# Patient Record
Sex: MALE | Race: WHITE | NOT HISPANIC OR LATINO | Employment: OTHER | URBAN - METROPOLITAN AREA
[De-identification: names, ages, dates, MRNs, and addresses within clinical notes are randomized per-mention and may not be internally consistent; named-entity substitution may affect disease eponyms.]

---

## 2023-02-26 ENCOUNTER — HOSPITAL ENCOUNTER (EMERGENCY)
Facility: CLINIC | Age: 73
Discharge: HOME | End: 2023-02-26
Attending: EMERGENCY MEDICINE | Admitting: EMERGENCY MEDICINE
Payer: MEDICARE

## 2023-02-26 ENCOUNTER — APPOINTMENT (OUTPATIENT)
Dept: ULTRASOUND IMAGING | Facility: CLINIC | Age: 73
End: 2023-02-26
Attending: EMERGENCY MEDICINE
Payer: MEDICARE

## 2023-02-26 VITALS
RESPIRATION RATE: 15 BRPM | DIASTOLIC BLOOD PRESSURE: 90 MMHG | TEMPERATURE: 97.6 F | OXYGEN SATURATION: 98 % | SYSTOLIC BLOOD PRESSURE: 160 MMHG | HEART RATE: 80 BPM

## 2023-02-26 DIAGNOSIS — I82.4Y2 ACUTE DEEP VEIN THROMBOSIS (DVT) OF PROXIMAL VEIN OF LEFT LOWER EXTREMITY (H): ICD-10-CM

## 2023-02-26 DIAGNOSIS — N18.9 ACUTE ON CHRONIC RENAL INSUFFICIENCY: ICD-10-CM

## 2023-02-26 DIAGNOSIS — N28.9 ACUTE ON CHRONIC RENAL INSUFFICIENCY: ICD-10-CM

## 2023-02-26 LAB
ANION GAP SERPL CALCULATED.3IONS-SCNC: 14 MMOL/L (ref 7–15)
BUN SERPL-MCNC: 32.3 MG/DL (ref 8–23)
CALCIUM SERPL-MCNC: 9.2 MG/DL (ref 8.8–10.2)
CHLORIDE SERPL-SCNC: 109 MMOL/L (ref 98–107)
CREAT SERPL-MCNC: 2.07 MG/DL (ref 0.67–1.17)
DEPRECATED HCO3 PLAS-SCNC: 20 MMOL/L (ref 22–29)
ERYTHROCYTE [DISTWIDTH] IN BLOOD BY AUTOMATED COUNT: 14.2 % (ref 10–15)
GFR SERPL CREATININE-BSD FRML MDRD: 33 ML/MIN/1.73M2
GLUCOSE SERPL-MCNC: 110 MG/DL (ref 70–99)
HCT VFR BLD AUTO: 46.7 % (ref 40–53)
HGB BLD-MCNC: 14.9 G/DL (ref 13.3–17.7)
INR PPP: 1.18 (ref 0.85–1.15)
MCH RBC QN AUTO: 31.4 PG (ref 26.5–33)
MCHC RBC AUTO-ENTMCNC: 31.9 G/DL (ref 31.5–36.5)
MCV RBC AUTO: 98 FL (ref 78–100)
PLATELET # BLD AUTO: 208 10E3/UL (ref 150–450)
POTASSIUM SERPL-SCNC: 4.8 MMOL/L (ref 3.4–5.3)
RBC # BLD AUTO: 4.75 10E6/UL (ref 4.4–5.9)
SODIUM SERPL-SCNC: 143 MMOL/L (ref 136–145)
WBC # BLD AUTO: 7.8 10E3/UL (ref 4–11)

## 2023-02-26 PROCEDURE — 258N000003 HC RX IP 258 OP 636: Performed by: EMERGENCY MEDICINE

## 2023-02-26 PROCEDURE — 93971 EXTREMITY STUDY: CPT | Mod: LT

## 2023-02-26 PROCEDURE — 85014 HEMATOCRIT: CPT | Performed by: EMERGENCY MEDICINE

## 2023-02-26 PROCEDURE — 250N000013 HC RX MED GY IP 250 OP 250 PS 637: Performed by: EMERGENCY MEDICINE

## 2023-02-26 PROCEDURE — 96360 HYDRATION IV INFUSION INIT: CPT

## 2023-02-26 PROCEDURE — 36415 COLL VENOUS BLD VENIPUNCTURE: CPT | Performed by: EMERGENCY MEDICINE

## 2023-02-26 PROCEDURE — 99284 EMERGENCY DEPT VISIT MOD MDM: CPT | Mod: 25

## 2023-02-26 PROCEDURE — 85610 PROTHROMBIN TIME: CPT | Performed by: EMERGENCY MEDICINE

## 2023-02-26 PROCEDURE — 80048 BASIC METABOLIC PNL TOTAL CA: CPT | Performed by: EMERGENCY MEDICINE

## 2023-02-26 RX ORDER — ATORVASTATIN CALCIUM 20 MG/1
1 TABLET, FILM COATED ORAL DAILY
COMMUNITY
Start: 2023-01-02

## 2023-02-26 RX ORDER — VITAMIN E 268 MG
400 CAPSULE ORAL
COMMUNITY

## 2023-02-26 RX ORDER — APIXABAN 5 MG (74)
KIT ORAL
Qty: 1 EACH | Refills: 0 | Status: SHIPPED | OUTPATIENT
Start: 2023-02-26 | End: 2023-03-28

## 2023-02-26 RX ORDER — LISINOPRIL 40 MG/1
20 TABLET ORAL DAILY
Status: ON HOLD | COMMUNITY
Start: 2023-01-14 | End: 2024-05-20

## 2023-02-26 RX ORDER — CARVEDILOL 12.5 MG/1
12.5 TABLET ORAL 2 TIMES DAILY
Status: ON HOLD | COMMUNITY
Start: 2023-01-14 | End: 2024-05-20

## 2023-02-26 RX ORDER — LIDOCAINE 40 MG/G
CREAM TOPICAL
Status: DISCONTINUED | OUTPATIENT
Start: 2023-02-26 | End: 2023-02-26 | Stop reason: HOSPADM

## 2023-02-26 RX ADMIN — SODIUM CHLORIDE 1000 ML: 9 INJECTION, SOLUTION INTRAVENOUS at 14:26

## 2023-02-26 RX ADMIN — APIXABAN 10 MG: 5 TABLET, FILM COATED ORAL at 14:49

## 2023-02-26 ASSESSMENT — ACTIVITIES OF DAILY LIVING (ADL)
ADLS_ACUITY_SCORE: 35
ADLS_ACUITY_SCORE: 35

## 2023-02-26 NOTE — ED PROVIDER NOTES
History     Chief Complaint:  Leg Pain       HPI   Demetrius Scruggs is a 72 year old male with a history of coronary artery disease with previous MI on aspirin who presents with a sore cramping sensation on his distal medial left calf starting on Wednesday.  The patient reports that he has not traveled has not been immobilized no recent surgery or procedures has been sitting reading more frequently and has no previous history of DVT.  The patient said the pain does not travel it is mainly when he walks.  He was concerned about this and therefore presented to the ER.  The patient has not had a recent hemoglobin and has no previous history of a DVT  His wife was also interviewed    Review of External Notes: I reviewed external notes to obtain history on past medical history as well as meds    ROS:  Review of Systems  8 point review of systems all negative except as in HPI    Allergies:  No Known Allergies     Medications:    Sildenafil  Lisinopril  Atorvastatin  Coreg  Metformin  Aspirin    Past Medical History:    Type II diabetes mellitus  Stage III CKD  Coronary disease  Ischemic heart disease  MI  Vitamin D deficiency  Hypertension     Past Surgical History:    Coronary artery bypass    Family History:  Heart disease - father  Alzheimer's disease - mother  Breast cancer - mother    Social History:  Patient is accompanied in the ED by his wife.  PCP: Guzman Schmidt     Physical Exam     Patient Vitals for the past 24 hrs:   BP Temp Temp src Pulse Resp SpO2   02/26/23 1528 (!) 160/90 -- -- 80 15 98 %   02/26/23 0953 (!) 147/96 -- -- 74 18 95 %   02/26/23 0951 -- 97.6  F (36.4  C) Temporal -- -- --        Physical Exam  General: The patient is alert, in no respiratory distress.      Cardiovascular: Regular rate and rhythm. Good pulses      Respiratory: Lungs are clear.     Gastrointestinal: Abdomen soft.     Musculoskeletal: No gross deformity.  No bony tenderness no joint swelling.  He has mild  tenderness along the left medial calf    Skin: No rashes or petechiae.  Normal coloration between both lower extremities    Neurologic: The patient is alert and oriented.  Good distal function.  Pleasant and conversant    Lymphatic: . No significant lower extremity swelling.    Psychiatric: The patient is non-tearful.    Emergency Department Course     Imaging:  US Lower Extremity Venous Duplex Left   Final Result   IMPRESSION:   1.  Positive study for deep vein thrombus.              Report per radiology    Laboratory:  Labs Ordered and Resulted from Time of ED Arrival to Time of ED Departure   BASIC METABOLIC PANEL - Abnormal       Result Value    Sodium 143      Potassium 4.8      Chloride 109 (*)     Carbon Dioxide (CO2) 20 (*)     Anion Gap 14      Urea Nitrogen 32.3 (*)     Creatinine 2.07 (*)     Calcium 9.2      Glucose 110 (*)     GFR Estimate 33 (*)    INR - Abnormal    INR 1.18 (*)    CBC WITH PLATELETS - Normal    WBC Count 7.8      RBC Count 4.75      Hemoglobin 14.9      Hematocrit 46.7      MCV 98      MCH 31.4      MCHC 31.9      RDW 14.2      Platelet Count 208          Procedures   none    Emergency Department Course & Assessments:       Interventions:  Medications   lidocaine 1 % 0.1-1 mL (has no administration in time range)   lidocaine (LMX4) cream (has no administration in time range)   sodium chloride (PF) 0.9% PF flush 3 mL (has no administration in time range)   sodium chloride (PF) 0.9% PF flush 3 mL (has no administration in time range)   0.9% sodium chloride BOLUS (0 mLs Intravenous Stopped 2/26/23 1522)   apixaban ANTICOAGULANT (ELIQUIS) tablet 10 mg (10 mg Oral $Given 2/26/23 1449)        Independent Interpretation (X-rays, CTs, rhythm strip):  I reviewed the report from the radiologist I do agree that this is a high probability DVT    Consultations/Discussion of Management or Tests:  1412 I reassessed the patient reported that his creatinine had gone up however in talking to his wife  she said that he recently had a kidney stone a week ago which is likely behind the bump in creatinine.  Check with pharmacy we can still dose his DOAC at the appropriate dose but will give him a liter bolus of fluid and have him follow-up with his primary to get rechecked.       Assessments:  I carefully examined the patient and did not think he has signs suggestive of PE or cerulea alba dolens    Disposition:  The patient was discharged to home.     Impression & Plan        Medical Decision Making:  With the patient complaining of pain in his lower extremity I did consider it in someone who has vascular disease causes such as dissection or arterial insufficiency peripheral vascular disease however DVT is high in the differential.  In fact does have a DVT.  There is no signs of cerulea dolens or alba.  There is no signs of cellulitis.  No focal bony tenderness to suggest fracture no signs of joint infection.  There is been no recent trauma to suggest hematoma.  The patient has good distal pulses.  I will check baseline labs to ensure that if he were little bleeding on medicines that he would have a baseline to compare to.  I did discuss the dosing of apixaban with the pharmacist due to the patient's renal insufficiency.  He is willing patient seems slightly worse but his wife reported that this is due to recent kidney stones he was hydrated here with fluid and follow-up with his primary doctor for recheck of his creatinine    Diagnosis:    ICD-10-CM    1. Acute deep vein thrombosis (DVT) of proximal vein of left lower extremity (H)  I82.4Y2       2. Acute on chronic renal insufficiency  N28.9     N18.9            Discharge Medications:  Discharge Medication List as of 2/26/2023  3:23 PM      START taking these medications    Details   Apixaban Starter Pack (ELIQUIS DVT/PE STARTER PACK) 5 MG TBPK Take 10 mg by mouth 2 times daily for 7 days, THEN 5 mg 2 times daily for 23 days., Disp-1 each, R-0, Local Print               Scribe Disclosure:  I, Eden Luciano, am serving as a scribe at 2:04 PM on 2/26/2023 to document services personally performed by Simeon Siu MD based on my observations and the provider's statements to me.    2/26/2023   Simeon Siu MD Farnan, Christopher M, MD  02/26/23 172

## 2023-02-26 NOTE — DISCHARGE INSTRUCTIONS
You will need to follow-up with your doctor for recheck of your creatinine to ensure that it comes down.  Return if bleeding or other problems.  Also return if chest pain or shortness of breath

## 2023-02-26 NOTE — ED TRIAGE NOTES
Pt presents to ED with left calf pain. Started on Wednesday. Feels like a leg cramp. Wakes him from sleeping. No recent extended travel, hospitalization or surgery. Denies known injury. Denies hx of dvt.

## 2024-05-17 ENCOUNTER — HOSPITAL ENCOUNTER (INPATIENT)
Facility: CLINIC | Age: 74
LOS: 3 days | Discharge: HOME | DRG: 683 | End: 2024-05-20
Attending: EMERGENCY MEDICINE | Admitting: HOSPITALIST
Payer: MEDICARE

## 2024-05-17 ENCOUNTER — APPOINTMENT (OUTPATIENT)
Dept: ULTRASOUND IMAGING | Facility: CLINIC | Age: 74
DRG: 683 | End: 2024-05-17
Attending: HOSPITALIST
Payer: MEDICARE

## 2024-05-17 DIAGNOSIS — N17.9 AKI (ACUTE KIDNEY INJURY) (H): ICD-10-CM

## 2024-05-17 DIAGNOSIS — I95.1 ORTHOSTATIC HYPOTENSION: ICD-10-CM

## 2024-05-17 LAB
ALBUMIN SERPL BCG-MCNC: 3.2 G/DL (ref 3.5–5.2)
ALBUMIN UR-MCNC: 30 MG/DL
ALP SERPL-CCNC: 373 U/L (ref 40–150)
ALT SERPL W P-5'-P-CCNC: 34 U/L (ref 0–70)
AMORPH CRY #/AREA URNS HPF: ABNORMAL /HPF
ANION GAP SERPL CALCULATED.3IONS-SCNC: 15 MMOL/L (ref 7–15)
APPEARANCE UR: ABNORMAL
AST SERPL W P-5'-P-CCNC: 67 U/L (ref 0–45)
ATRIAL RATE - MUSE: 68 BPM
BACTERIA #/AREA URNS HPF: ABNORMAL /HPF
BASOPHILS # BLD AUTO: 0.1 10E3/UL (ref 0–0.2)
BASOPHILS NFR BLD AUTO: 1 %
BILIRUB SERPL-MCNC: 0.6 MG/DL
BILIRUB UR QL STRIP: NEGATIVE
BUN SERPL-MCNC: 54.3 MG/DL (ref 8–23)
CALCIUM SERPL-MCNC: 8.9 MG/DL (ref 8.8–10.2)
CAOX CRY #/AREA URNS HPF: ABNORMAL /HPF
CHLORIDE SERPL-SCNC: 103 MMOL/L (ref 98–107)
COLOR UR AUTO: YELLOW
CREAT SERPL-MCNC: 2.76 MG/DL (ref 0.67–1.17)
CREAT SERPL-MCNC: 3.17 MG/DL (ref 0.67–1.17)
CREAT UR-MCNC: 294.7 MG/DL
DEPRECATED HCO3 PLAS-SCNC: 21 MMOL/L (ref 22–29)
DIASTOLIC BLOOD PRESSURE - MUSE: NORMAL MMHG
EGFRCR SERPLBLD CKD-EPI 2021: 20 ML/MIN/1.73M2
EGFRCR SERPLBLD CKD-EPI 2021: 24 ML/MIN/1.73M2
EOSINOPHIL # BLD AUTO: 0.1 10E3/UL (ref 0–0.7)
EOSINOPHIL NFR BLD AUTO: 2 %
ERYTHROCYTE [DISTWIDTH] IN BLOOD BY AUTOMATED COUNT: 13.6 % (ref 10–15)
FRACT EXCRET NA UR+SERPL-RTO: 0.3 %
GLUCOSE BLDC GLUCOMTR-MCNC: 85 MG/DL (ref 70–99)
GLUCOSE SERPL-MCNC: 170 MG/DL (ref 70–99)
GLUCOSE UR STRIP-MCNC: NEGATIVE MG/DL
GRANULAR CAST: 215 /LPF
HBA1C MFR BLD: 6.2 %
HCO3 BLDV-SCNC: 22 MMOL/L (ref 21–28)
HCT VFR BLD AUTO: 41.9 % (ref 40–53)
HGB BLD-MCNC: 13.5 G/DL (ref 13.3–17.7)
HGB UR QL STRIP: ABNORMAL
HOLD SPECIMEN: NORMAL
HOLD SPECIMEN: NORMAL
HYALINE CASTS: 40 /LPF
IMM GRANULOCYTES # BLD: 0.1 10E3/UL
IMM GRANULOCYTES NFR BLD: 1 %
INTERPRETATION ECG - MUSE: NORMAL
KETONES UR STRIP-MCNC: NEGATIVE MG/DL
LACTATE BLD-SCNC: 3.3 MMOL/L
LACTATE SERPL-SCNC: 1.7 MMOL/L (ref 0.7–2)
LEUKOCYTE ESTERASE UR QL STRIP: ABNORMAL
LYMPHOCYTES # BLD AUTO: 1.6 10E3/UL (ref 0.8–5.3)
LYMPHOCYTES NFR BLD AUTO: 18 %
MCH RBC QN AUTO: 30.8 PG (ref 26.5–33)
MCHC RBC AUTO-ENTMCNC: 32.2 G/DL (ref 31.5–36.5)
MCV RBC AUTO: 95 FL (ref 78–100)
MONOCYTES # BLD AUTO: 0.9 10E3/UL (ref 0–1.3)
MONOCYTES NFR BLD AUTO: 11 %
MUCOUS THREADS #/AREA URNS LPF: PRESENT /LPF
NEUTROPHILS # BLD AUTO: 5.9 10E3/UL (ref 1.6–8.3)
NEUTROPHILS NFR BLD AUTO: 67 %
NITRATE UR QL: NEGATIVE
NRBC # BLD AUTO: 0 10E3/UL
NRBC BLD AUTO-RTO: 0 /100
P AXIS - MUSE: 3 DEGREES
PCO2 BLDV: 45 MM HG (ref 40–50)
PH BLDV: 7.29 [PH] (ref 7.32–7.43)
PH UR STRIP: 5 [PH] (ref 5–7)
PLATELET # BLD AUTO: 372 10E3/UL (ref 150–450)
PO2 BLDV: 18 MM HG (ref 25–47)
POTASSIUM SERPL-SCNC: 4.7 MMOL/L (ref 3.4–5.3)
PR INTERVAL - MUSE: 180 MS
PROT SERPL-MCNC: 6.9 G/DL (ref 6.4–8.3)
QRS DURATION - MUSE: 118 MS
QT - MUSE: 406 MS
QTC - MUSE: 431 MS
R AXIS - MUSE: -58 DEGREES
RBC # BLD AUTO: 4.39 10E6/UL (ref 4.4–5.9)
RBC CAST: 26 /LPF
RBC URINE: 16 /HPF
SAO2 % BLDV: 22 % (ref 70–75)
SODIUM SERPL-SCNC: 138 MMOL/L (ref 135–145)
SODIUM SERPL-SCNC: 139 MMOL/L (ref 135–145)
SODIUM UR-SCNC: 39 MMOL/L
SODIUM UR-SCNC: 51 MMOL/L
SP GR UR STRIP: 1.02 (ref 1–1.03)
SQUAMOUS EPITHELIAL: <1 /HPF
SYSTOLIC BLOOD PRESSURE - MUSE: NORMAL MMHG
T AXIS - MUSE: 113 DEGREES
TROPONIN T SERPL HS-MCNC: 22 NG/L
TROPONIN T SERPL HS-MCNC: 26 NG/L
UROBILINOGEN UR STRIP-MCNC: NORMAL MG/DL
VENTRICULAR RATE- MUSE: 68 BPM
WBC # BLD AUTO: 8.6 10E3/UL (ref 4–11)
WBC URINE: 4 /HPF

## 2024-05-17 PROCEDURE — 83036 HEMOGLOBIN GLYCOSYLATED A1C: CPT | Performed by: HOSPITALIST

## 2024-05-17 PROCEDURE — 84484 ASSAY OF TROPONIN QUANT: CPT | Performed by: EMERGENCY MEDICINE

## 2024-05-17 PROCEDURE — 82040 ASSAY OF SERUM ALBUMIN: CPT | Performed by: EMERGENCY MEDICINE

## 2024-05-17 PROCEDURE — 87040 BLOOD CULTURE FOR BACTERIA: CPT | Performed by: EMERGENCY MEDICINE

## 2024-05-17 PROCEDURE — 99223 1ST HOSP IP/OBS HIGH 75: CPT | Mod: AI | Performed by: HOSPITALIST

## 2024-05-17 PROCEDURE — 250N000013 HC RX MED GY IP 250 OP 250 PS 637: Performed by: HOSPITALIST

## 2024-05-17 PROCEDURE — 83605 ASSAY OF LACTIC ACID: CPT

## 2024-05-17 PROCEDURE — 36415 COLL VENOUS BLD VENIPUNCTURE: CPT | Performed by: EMERGENCY MEDICINE

## 2024-05-17 PROCEDURE — 96360 HYDRATION IV INFUSION INIT: CPT

## 2024-05-17 PROCEDURE — 51798 US URINE CAPACITY MEASURE: CPT

## 2024-05-17 PROCEDURE — 85025 COMPLETE CBC W/AUTO DIFF WBC: CPT | Performed by: EMERGENCY MEDICINE

## 2024-05-17 PROCEDURE — 82565 ASSAY OF CREATININE: CPT | Performed by: HOSPITALIST

## 2024-05-17 PROCEDURE — 93005 ELECTROCARDIOGRAM TRACING: CPT

## 2024-05-17 PROCEDURE — 84300 ASSAY OF URINE SODIUM: CPT | Performed by: HOSPITALIST

## 2024-05-17 PROCEDURE — 81003 URINALYSIS AUTO W/O SCOPE: CPT | Performed by: EMERGENCY MEDICINE

## 2024-05-17 PROCEDURE — 258N000003 HC RX IP 258 OP 636: Performed by: EMERGENCY MEDICINE

## 2024-05-17 PROCEDURE — 120N000001 HC R&B MED SURG/OB

## 2024-05-17 PROCEDURE — 99285 EMERGENCY DEPT VISIT HI MDM: CPT | Mod: 25

## 2024-05-17 PROCEDURE — 36415 COLL VENOUS BLD VENIPUNCTURE: CPT | Performed by: HOSPITALIST

## 2024-05-17 PROCEDURE — 76770 US EXAM ABDO BACK WALL COMP: CPT

## 2024-05-17 PROCEDURE — 82803 BLOOD GASES ANY COMBINATION: CPT

## 2024-05-17 PROCEDURE — 84295 ASSAY OF SERUM SODIUM: CPT | Performed by: HOSPITALIST

## 2024-05-17 PROCEDURE — 258N000003 HC RX IP 258 OP 636: Performed by: HOSPITALIST

## 2024-05-17 RX ORDER — CALCIUM CARBONATE 500 MG/1
1000 TABLET, CHEWABLE ORAL 4 TIMES DAILY PRN
Status: DISCONTINUED | OUTPATIENT
Start: 2024-05-17 | End: 2024-05-20 | Stop reason: HOSPADM

## 2024-05-17 RX ORDER — VITAMIN B COMPLEX
2000 TABLET ORAL
Status: DISCONTINUED | OUTPATIENT
Start: 2024-05-18 | End: 2024-05-20 | Stop reason: HOSPADM

## 2024-05-17 RX ORDER — LATANOPROST 50 UG/ML
1 SOLUTION/ DROPS OPHTHALMIC AT BEDTIME
Status: DISCONTINUED | OUTPATIENT
Start: 2024-05-17 | End: 2024-05-20 | Stop reason: HOSPADM

## 2024-05-17 RX ORDER — ACETAMINOPHEN 325 MG/1
650 TABLET ORAL EVERY 4 HOURS PRN
Status: DISCONTINUED | OUTPATIENT
Start: 2024-05-17 | End: 2024-05-20 | Stop reason: HOSPADM

## 2024-05-17 RX ORDER — ASPIRIN 325 MG
325 TABLET, DELAYED RELEASE (ENTERIC COATED) ORAL DAILY
Status: DISCONTINUED | OUTPATIENT
Start: 2024-05-18 | End: 2024-05-20 | Stop reason: HOSPADM

## 2024-05-17 RX ORDER — AMOXICILLIN 250 MG
1 CAPSULE ORAL 2 TIMES DAILY PRN
Status: DISCONTINUED | OUTPATIENT
Start: 2024-05-17 | End: 2024-05-20 | Stop reason: HOSPADM

## 2024-05-17 RX ORDER — AMOXICILLIN 250 MG
2 CAPSULE ORAL 2 TIMES DAILY PRN
Status: DISCONTINUED | OUTPATIENT
Start: 2024-05-17 | End: 2024-05-20 | Stop reason: HOSPADM

## 2024-05-17 RX ORDER — LIDOCAINE 40 MG/G
CREAM TOPICAL
Status: DISCONTINUED | OUTPATIENT
Start: 2024-05-17 | End: 2024-05-20 | Stop reason: HOSPADM

## 2024-05-17 RX ORDER — ZINC GLUCONATE 50 MG
50 TABLET ORAL DAILY
COMMUNITY

## 2024-05-17 RX ORDER — SODIUM CHLORIDE 9 MG/ML
INJECTION, SOLUTION INTRAVENOUS CONTINUOUS
Status: DISCONTINUED | OUTPATIENT
Start: 2024-05-17 | End: 2024-05-18

## 2024-05-17 RX ORDER — LATANOPROST 50 UG/ML
1 SOLUTION/ DROPS OPHTHALMIC AT BEDTIME
COMMUNITY

## 2024-05-17 RX ORDER — NICOTINE POLACRILEX 4 MG
15-30 LOZENGE BUCCAL
Status: DISCONTINUED | OUTPATIENT
Start: 2024-05-17 | End: 2024-05-20 | Stop reason: HOSPADM

## 2024-05-17 RX ORDER — DEXTROSE MONOHYDRATE 25 G/50ML
25-50 INJECTION, SOLUTION INTRAVENOUS
Status: DISCONTINUED | OUTPATIENT
Start: 2024-05-17 | End: 2024-05-20 | Stop reason: HOSPADM

## 2024-05-17 RX ORDER — ACETAMINOPHEN 650 MG/1
650 SUPPOSITORY RECTAL EVERY 4 HOURS PRN
Status: DISCONTINUED | OUTPATIENT
Start: 2024-05-17 | End: 2024-05-20 | Stop reason: HOSPADM

## 2024-05-17 RX ORDER — ATORVASTATIN CALCIUM 20 MG/1
20 TABLET, FILM COATED ORAL AT BEDTIME
Status: DISCONTINUED | OUTPATIENT
Start: 2024-05-17 | End: 2024-05-20 | Stop reason: HOSPADM

## 2024-05-17 RX ADMIN — SODIUM CHLORIDE 500 ML: 9 INJECTION, SOLUTION INTRAVENOUS at 14:22

## 2024-05-17 RX ADMIN — LATANOPROST 1 DROP: 50 SOLUTION/ DROPS OPHTHALMIC at 21:15

## 2024-05-17 RX ADMIN — SODIUM CHLORIDE: 9 INJECTION, SOLUTION INTRAVENOUS at 23:20

## 2024-05-17 RX ADMIN — SODIUM CHLORIDE 1000 ML: 9 INJECTION, SOLUTION INTRAVENOUS at 17:26

## 2024-05-17 RX ADMIN — ATORVASTATIN CALCIUM 20 MG: 20 TABLET, FILM COATED ORAL at 21:15

## 2024-05-17 RX ADMIN — SODIUM CHLORIDE 1000 ML: 9 INJECTION, SOLUTION INTRAVENOUS at 13:27

## 2024-05-17 ASSESSMENT — ACTIVITIES OF DAILY LIVING (ADL)
ADLS_ACUITY_SCORE: 35
ADLS_ACUITY_SCORE: 18
ADLS_ACUITY_SCORE: 35
ADLS_ACUITY_SCORE: 18
ADLS_ACUITY_SCORE: 18
ADLS_ACUITY_SCORE: 35

## 2024-05-17 ASSESSMENT — COLUMBIA-SUICIDE SEVERITY RATING SCALE - C-SSRS
2. HAVE YOU ACTUALLY HAD ANY THOUGHTS OF KILLING YOURSELF IN THE PAST MONTH?: NO
1. IN THE PAST MONTH, HAVE YOU WISHED YOU WERE DEAD OR WISHED YOU COULD GO TO SLEEP AND NOT WAKE UP?: NO
6. HAVE YOU EVER DONE ANYTHING, STARTED TO DO ANYTHING, OR PREPARED TO DO ANYTHING TO END YOUR LIFE?: NO

## 2024-05-17 NOTE — ED NOTES
"[facility]  ED Nurse Handoff Report  RECEIVING UNIT ED HANDOFF REVIEW    Above ED Nurse Handoff Report was reviewed: Yes  Reviewed by: Iram Villarreal, RN on May 17, 2024 at 4:15 PM   LILLIAM Lopez called the ED to inform them the note was read: Yes     ED Chief complaint: Hypotension  . ED Diagnosis:   Final diagnoses:   ZENAIDA (acute kidney injury) (H24)   Orthostatic hypotension       Allergies: No Known Allergies    Code Status: MD to talk to pt.     Activity level - Baseline/Home:  independent.  Activity Level - Current:   standby and assist of 1.   Lift room needed: No.   Bariatric: No   Needed: No   Isolation: No.   Infection: Not Applicable.     Respiratory status: Room air    Vital Signs (within 30 minutes):   Vitals:    05/17/24 1256 05/17/24 1257   BP: 118/63    Pulse: 71    Resp: 17    Temp: 97.7  F (36.5  C)    TempSrc: Oral    SpO2: 97%    Weight:  99.8 kg (220 lb)   Height:  1.778 m (5' 10\")       Cardiac Rhythm:  ,      Pain level:    Patient confused: No.   Patient Falls Risk: nonskid shoes/slippers when out of bed, arm band in place, patient and family education, and activity supervised.   Elimination Status: Has voided     Patient Report - Initial Complaint: Hypotension.   Focused Assessment:    HPI  Demetrius Scruggs is a 73 year old male with a history of type 2 diabetes, hypertension, hyperlipidemia, myocardial infarction, chronic kidney disease (stage 3), who prevents to the emergency department today for evaluation of hypotension. He states that he had his gallbladder removed on 4/15 in [place], and since then he has been experiencing hypotension. He checks his BP levels at home. He reports that he took his BP medication prior to checking his BP levels, which were at 66/47 this morning. He states that he saw his primary on 05/06, in which he had his lisinopril dose cut in half to 40 mg. He also notes that he took 20 mg of atorvastatin this morning and 12 mg " "of carvedilol at 0930 this am. He denies having any chest pain, shortness of breath, abdominal pain, as well as any fevers. He also does not have any bleeding or history of anemia. Although he denies having any nausea or diarrhea, he does admit to vomiting a few days ago. He also notes that his appetite is unusually low since the surgery and states that \"everything seems to taste bad.\" However, he has been trying to stay hydrated. He admits to feeling some lightheadedness with movement. He has a history of myocardial infarction in 1999, but otherwise no history of heart failure.      Abnormal Results:   Labs Ordered and Resulted from Time of ED Arrival to Time of ED Departure   COMPREHENSIVE METABOLIC PANEL - Abnormal       Result Value    Sodium 139      Potassium 4.7      Carbon Dioxide (CO2) 21 (*)     Anion Gap 15      Urea Nitrogen 54.3 (*)     Creatinine 3.17 (*)     GFR Estimate 20 (*)     Calcium 8.9      Chloride 103      Glucose 170 (*)     Alkaline Phosphatase 373 (*)     AST 67 (*)     ALT 34      Protein Total 6.9      Albumin 3.2 (*)     Bilirubin Total 0.6     CBC WITH PLATELETS AND DIFFERENTIAL - Abnormal    WBC Count 8.6      RBC Count 4.39 (*)     Hemoglobin 13.5      Hematocrit 41.9      MCV 95      MCH 30.8      MCHC 32.2      RDW 13.6      Platelet Count 372      % Neutrophils 67      % Lymphocytes 18      % Monocytes 11      % Eosinophils 2      % Basophils 1      % Immature Granulocytes 1      NRBCs per 100 WBC 0      Absolute Neutrophils 5.9      Absolute Lymphocytes 1.6      Absolute Monocytes 0.9      Absolute Eosinophils 0.1      Absolute Basophils 0.1      Absolute Immature Granulocytes 0.1      Absolute NRBCs 0.0     ISTAT GASES LACTATE VENOUS POCT - Abnormal    Lactic Acid POCT 3.3 (*)     Bicarbonate Venous POCT 22      O2 Sat, Venous POCT 22 (*)     pCO2 Venous POCT 45      pH Venous POCT 7.29 (*)     pO2 Venous POCT 18 (*)    TROPONIN T, HIGH SENSITIVITY - Abnormal    Troponin T, " High Sensitivity 26 (*)    ROUTINE UA WITH MICROSCOPIC REFLEX TO CULTURE   TROPONIN T, HIGH SENSITIVITY   LACTIC ACID WHOLE BLOOD   BLOOD CULTURE   BLOOD CULTURE        No orders to display       Treatments provided: See MAR  Family Comments: In room.   OBS brochure/video discussed/provided to patient:  N/A  ED Medications:   Medications   sodium chloride 0.9% BOLUS 500 mL (500 mLs Intravenous $New Bag 5/17/24 1422)   sodium chloride 0.9% BOLUS 1,000 mL (1,000 mLs Intravenous $New Bag 5/17/24 1327)       Drips infusing:  Yes  For the majority of the shift this patient was Green.   Interventions performed were NA.    Sepsis treatment initiated: No    Cares/treatment/interventions/medications to be completed following ED care: See Orders.     ED Nurse Name: Varsha Wing RN  3:14 PM

## 2024-05-17 NOTE — PLAN OF CARE
"To Do:  End of Shift Summary  For vital signs and complete assessments, please see documentation flowsheets.     Pertinent assessments:  A&O--- up in room--denies pain and nausea --- appetite good---  Major Shift Events info   waiting test results  Treatment Plan: mmonitor  Bedside Nurse: Iram Villarreal RN       Problem: Adult Inpatient Plan of Care  Goal: Plan of Care Review  Description: The Plan of Care Review/Shift note should be completed every shift.  The Outcome Evaluation is a brief statement about your assessment that the patient is improving, declining, or no change.  This information will be displayed automatically on your shift  note.  Outcome: Progressing  Flowsheets (Taken 5/17/2024 1828)  Outcome Evaluation: waiting test results  Plan of Care Reviewed With: patient  Overall Patient Progress: no change  Goal: Patient-Specific Goal (Individualized)  Description: You can add care plan individualizations to a care plan. Examples of Individualization might be:  \"Parent requests to be called daily at 9am for status\", \"I have a hard time hearing out of my right ear\", or \"Do not touch me to wake me up as it startles  me\".  Outcome: Progressing  Goal: Absence of Hospital-Acquired Illness or Injury  Outcome: Progressing  Intervention: Identify and Manage Fall Risk  Recent Flowsheet Documentation  Taken 5/17/2024 1800 by Iram Villarreal RN  Safety Promotion/Fall Prevention: activity supervised  Intervention: Prevent Infection  Recent Flowsheet Documentation  Taken 5/17/2024 1800 by Iram Villarreal, RN  Infection Prevention: hand hygiene promoted  Goal: Optimal Comfort and Wellbeing  Outcome: Progressing  Goal: Readiness for Transition of Care  Outcome: Progressing  Intervention: Mutually Develop Transition Plan  Recent Flowsheet Documentation  Taken 5/17/2024 1700 by Iram Villarreal, RN  Equipment Currently Used at Home: none   Goal Outcome Evaluation:      Plan of Care Reviewed With: patient    Overall Patient " Progress: no changeOverall Patient Progress: no change    Outcome Evaluation: waiting test results

## 2024-05-17 NOTE — PHARMACY-ADMISSION MEDICATION HISTORY
Pharmacist Admission Medication History    Admission medication history is complete. The information provided in this note is only as accurate as the sources available at the time of the update.    Information Source(s): Patient and CareEverywhere/SureScripts via in-person    Pertinent Information:     See [facility] encounter 05/06/2024 related to medication changes - reduced lisinopril and metformin.  Patient self-reduced carvedilol due to low BPs at home.     Changes made to PTA medication list:  Added: Latanoprost, Zinc  Deleted: None  Changed:   1. Carvedilol 18.75mg BID WC --> 12.5mg BID  2. Lisinopril 40mg daily --> 20mg daily  3. Metfromin 1000mg BID WC --> 500mg BID WC    Allergies reviewed with patient and updates made in EHR: yes    Medication History Completed By: Tio Schneider [facility] 5/17/2024 2:00 PM    PTA Med List   Medication Sig Last Dose    ascorbic acid 1000 MG TABS tablet Take 1,000 mg by mouth 5/17/2024 at AM    aspirin (ASA) 325 MG EC tablet Take 325 mg by mouth daily 5/17/2024 at AM    atorvastatin (LIPITOR) 20 MG tablet Take 1 tablet by mouth daily 5/16/2024 at HS    carvedilol (COREG) 12.5 MG tablet Take 12.5 mg by mouth 2 times daily 5/17/2024 at AM    cholecalciferol 50 MCG (2000 UT) CAPS Take 2,000 Units by mouth 5/17/2024 at AM    latanoprost (XALATAN) 0.005 % ophthalmic solution Place 1 drop into both eyes at bedtime 5/16/2024 at HS    lisinopril (ZESTRIL) 40 MG tablet Take 20 mg by mouth daily 5/17/2024 at AM    metFORMIN (GLUCOPHAGE) 1000 MG tablet Take 500 mg by mouth 2 times daily (with meals) 5/17/2024 at x1    vitamin E (TOCOPHEROL) 400 units (180 mg) capsule Take 400 Units by mouth 5/17/2024 at AM    zinc gluconate 50 MG tablet Take 50 mg by mouth daily 5/17/2024 at AM

## 2024-05-17 NOTE — ED PROVIDER NOTES
"  Emergency Department Note      History of Present Illness     Chief Complaint  Hypotension    HPI  Demetrius Scruggs is a 73 year old male with a history of type 2 diabetes, hypertension, hyperlipidemia, myocardial infarction, chronic kidney disease (stage 3), who prevents to the emergency department today for evaluation of hypotension. He states that he had his gallbladder removed on 4/15 in [place], and since then he has been experiencing hypotension. He checks his BP levels at home. He reports that he took his BP medication prior to checking his BP levels, which were at 66/47 this morning. He states that he saw his primary on 05/06, in which he had his lisinopril dose cut in half to 40 mg. He also notes that he took 20 mg of atorvastatin this morning and 12 mg of carvedilol at 0930 this am. He denies having any chest pain, shortness of breath, abdominal pain, as well as any fevers. He also does not have any bleeding or history of anemia. Although he denies having any nausea or diarrhea, he does admit to vomiting a few days ago. He also notes that his appetite is unusually low since the surgery and states that \"everything seems to taste bad.\"  He has no abdominal pain.  However, he has been trying to stay hydrated. He admits to feeling some lightheadedness with movement. He has a history of myocardial infarction in 1999, but otherwise no history of heart failure.     Independent Historian  None    Review of External Notes  Patient recently followed up with PCP May 6, 2024 during that visit he had basic blood work obtained.  BUN of 26, creatinine of 1.86.  Past Medical History   Medical History and Problem List  Type 2 Diabetes  Ischemic Heart Disease  MI  Systolic Dysfunction  CKD Stage 3  Coronary Atherosclerosis  HLD  HTN    Medications  aspirin   atorvastatin   carvedilol   latanoprost   lisinopril   metformin     Surgical History   Cholecystectomy  Angioplasty    Physical Exam   Patient Vitals for " "the past 24 hrs:   BP Temp Temp src Pulse Resp SpO2 Height Weight   05/17/24 1257 -- -- -- -- -- -- 1.778 m (5' 10\") 99.8 kg (220 lb)   05/17/24 1256 118/63 97.7  F (36.5  C) Oral 71 17 97 % -- --     Physical Exam  Vitals reviewed.   Constitutional:       General: He is not in acute distress.     Appearance: He is not ill-appearing.   HENT:      Head: Normocephalic and atraumatic.   Eyes:      Extraocular Movements: Extraocular movements intact.   Cardiovascular:      Rate and Rhythm: Normal rate and regular rhythm.   Pulmonary:      Effort: Pulmonary effort is normal. No respiratory distress.      Breath sounds: Normal breath sounds. No wheezing.   Abdominal:      Palpations: Abdomen is soft.      Tenderness: There is no abdominal tenderness. There is no guarding.   Musculoskeletal:      Cervical back: Normal range of motion.   Skin:     General: Skin is warm and dry.   Neurological:      Mental Status: He is alert and oriented to person, place, and time.      GCS: GCS eye subscore is 4. GCS verbal subscore is 5. GCS motor subscore is 6.   Psychiatric:         Behavior: Behavior normal.         Diagnostics   Lab Results   Labs Ordered and Resulted from Time of ED Arrival to Time of ED Departure   COMPREHENSIVE METABOLIC PANEL - Abnormal       Result Value    Sodium 139      Potassium 4.7      Carbon Dioxide (CO2) 21 (*)     Anion Gap 15      Urea Nitrogen 54.3 (*)     Creatinine 3.17 (*)     GFR Estimate 20 (*)     Calcium 8.9      Chloride 103      Glucose 170 (*)     Alkaline Phosphatase 373 (*)     AST 67 (*)     ALT 34      Protein Total 6.9      Albumin 3.2 (*)     Bilirubin Total 0.6     CBC WITH PLATELETS AND DIFFERENTIAL - Abnormal    WBC Count 8.6      RBC Count 4.39 (*)     Hemoglobin 13.5      Hematocrit 41.9      MCV 95      MCH 30.8      MCHC 32.2      RDW 13.6      Platelet Count 372      % Neutrophils 67      % Lymphocytes 18      % Monocytes 11      % Eosinophils 2      % Basophils 1      % " Immature Granulocytes 1      NRBCs per 100 WBC 0      Absolute Neutrophils 5.9      Absolute Lymphocytes 1.6      Absolute Monocytes 0.9      Absolute Eosinophils 0.1      Absolute Basophils 0.1      Absolute Immature Granulocytes 0.1      Absolute NRBCs 0.0     ISTAT GASES LACTATE VENOUS POCT - Abnormal    Lactic Acid POCT 3.3 (*)     Bicarbonate Venous POCT 22      O2 Sat, Venous POCT 22 (*)     pCO2 Venous POCT 45      pH Venous POCT 7.29 (*)     pO2 Venous POCT 18 (*)    TROPONIN T, HIGH SENSITIVITY - Abnormal    Troponin T, High Sensitivity 26 (*)    ROUTINE UA WITH MICROSCOPIC REFLEX TO CULTURE   TROPONIN T, HIGH SENSITIVITY   BLOOD CULTURE   BLOOD CULTURE     Imaging  No orders to display     EKG   ECG taken at 1257, ECG read at 1324  Normal sinus rhythm   Left anterior fascicular block   Septal infarct, age undetermined   T wave abnormality, consider lateral ischemia   Abnormal ECG   Rate 68 bpm. TN interval 180 ms. QRS duration 118 ms. QT/QTc 406/431 ms. P-R-T axes 3 -58 113.    Independent Interpretation  None    ED Course    Medications Administered  Medications   sodium chloride 0.9% BOLUS 500 mL (500 mLs Intravenous $New Bag 5/17/24 1422)   sodium chloride 0.9% BOLUS 1,000 mL (1,000 mLs Intravenous $New Bag 5/17/24 1327)     Procedures  None     Discussion of Management  Admitting Hospitalist, Dr. Stroud    Social Determinants of Health adding to complexity of care  None    ED Course  ED Course as of 05/17/24 1456   Fri May 17, 2024   1342 Creatinine from earlier this month was 1.86   1438 Orthostatics positive.    1439 I spoke with Dr. Stroud from Hospitalist Services, who accepts the patient for admission.     Medical Decision Making / Diagnosis   CMS Diagnoses: The Lactic acid level is elevated due to ZENAIDA, dehydration, at this time there is no sign of severe sepsis or septic shock.    MIPS     None    MDM  Demetrius Srcuggs is a 73 year old male who presents today with hypotension.  He states  that he has had issues with his blood pressure medications recently had medication adjustments done.  States that today he was hypotensive with systolic blood pressures in the 60s.  He states that he took his blood pressure after taking his daily medications.  He denies any chest pain or shortness of breath.  He has had overall decrease in p.o. intake since recent cholecystectomy in April which was done in [place].  Patient currently is hemodynamically stable.  Was started on IV fluids.  Orthostatics were found to be positive with a drop in systolic blood pressure upon standing.  He is noted have significant elevation in his creatinine compared to recent creatinine that was 1.86 earlier this month.  Today it is 3.  Bladder scan was performed that showed no signs of obstruction, retention.  He had 14 cc in the bladder.  I discussed these findings with patient and wife.  Discussed plan for admission for ZENAIDA.  I suspect that his elevated lactic acid is due to dehydration.  Very low suspicion for infectious source as patient has been afebrile.  Has no clear source of infection.    Disposition  The patient was admitted to the hospital.     ICD-10 Codes:    ICD-10-CM    1. ZENAIDA (acute kidney injury) (H24)  N17.9       2. Orthostatic hypotension  I95.1            Scribe Disclosure:  I, Mary Ann Dale, am serving as a scribe at 2:35 PM on 5/17/2024 to document services personally performed by Mary Cortez DO based on my observations and the provider's statements to me.        Mary Cortez DO  05/17/24 [id number]

## 2024-05-17 NOTE — ED TRIAGE NOTES
Pt arrives via EMS w/ complaint hypotension, lightheadedness, decrease appetite for past wk. 88/66 for EMS, flat was 112/74. No falls. No pain. No fevers. EMS gave 250 NS.   Emergency gall bladder surgery 1 month ago.

## 2024-05-17 NOTE — H&P
[facility]    History and Physical - Hospitalist Service       Date of Admission:  5/17/2024    Assessment & Plan    Demetrius Scruggs is a 73 year old male who presents to the emergency department with report of low blood pressure, feeling weak and lightheaded.  Symptoms are relatively new for this patient. The story started when he had biliary colic and gallstone pancreatitis while vacationing in [place].     Acute kidney injury superimposed on CKD stage IIIb.  Declining urine output.  Unclear etiology at this point, suspect prerenal due to low oral intake after gallbladder surgery at middle of April. However  presence of granular casts in the urine sediment are very concerning for ATN. RBC casts concerning for glomerular disease  Renal ultrasound has been requested, FENa and sodium at random in urine have been requested.  Patient had normal saline bolus in the emergency department.  Plan to continue maintenance with normal saline 100 mill per hour  With close follow-up urine output, blood pressure, orthostatic hypotension and electrolytes.  Correction per protocol as needed.  PCD's given current renal function.  No NSAIDs, no nephrotoxin  Request Nephrology consult.       Unintended weight loss> 30 pounds   Anosmia after lap cholecystectomy middle of April.  Poor appetite and oral intake.  Patient reports loss of appetite immediately after the laparoscopic cholecystectomy.  He has had such poor oral intake that his body weight is down by more than 30 pounds, he is eating and drinking to a minimum and has noticed declining urine output, 2 or 3 times a day small quantities.      Type 2 diabetes mellitus, well-controlled with diet and metformin.  Given worsening of renal function, metformin should be on hold.  Medium sliding scale for correction.  Hypoglycemia protocol    Essential hypertension.  Home medications carvedilol and lisinopril.  Given current hypotension and declining  "renal function both medications would be on hold.    Glaucoma.  Continue Xalatan.      Diet:  Renal non dialysis   DVT Prophylaxis: Pneumatic Compression Devices  Gallagher Catheter: Not present  Lines: None     Cardiac Monitoring: None  Code Status:  Full code    Clinically Significant Risk Factors Present on Admission              # Hypoalbuminemia: Lowest albumin = 3.2 g/dL at 5/17/2024 12:58 PM, will monitor as appropriate   # Drug Induced Platelet Defect: home medication list includes an antiplatelet medication   # Hypertension: Home medication list includes antihypertensive(s)      # Obesity: Estimated body mass index is 31.57 kg/m  as calculated from the following:    Height as of this encounter: 1.778 m (5' 10\").    Weight as of this encounter: 99.8 kg (220 lb).              Disposition Plan     Medically Ready for Discharge: Anticipated in 2-4 Days      Dawson Stroud MD  Hospitalist Service  [facility]  Securely message with [employer] (more info)  Text page via [facility] Paging/Directory     ______________________________________________________________________    Chief Complaint   Low blood pressure, lack of smell, poor appetite and significant weight loss    History is obtained from the patient, electronic health record, emergency department physician, and patient's spouse    History of Present Illness   Demetrius Scruggs is a 73 year old male who presents to the emergency department with report of low blood pressure, feeling weak and lightheaded.  Symptoms are relatively new for this patient. The story started when he had biliary colic and gallstone pancreatitis while vacationing in [place].  He needed a laparoscopic cholecystectomy.  After the surgery back in April, the patient noticed he had lost his sense of smell.  He found no taste in the foods, his appetite has declined significantly, his oral intake has been very poor and in consequence he has lost around 30 pounds.  At " the same time, he has noticed that his blood pressure has been on the lower side and his blood sugar normal low.  He has a longstanding history of high blood pressure, he was diagnosed with type 2 diabetes around 20 years ago and kidney disease about 5 years ago.  His most important home medications are carvedilol, lisinopril, metformin and Xalatan for glaucoma.    CBC is normal.  Chemistry shows carbon dioxide 21, BUN 54.3, creatinine one 3.17, GFR less than 20 (last value was compatible with CKD stage IIIb with EGFR 33 and creatinine 2.07)  Alkaline phosphatase 373.  Glucose 170.  ALT normal, AST 67.  Initial high sensitive drop 26, second sample 22.    VBG.  pH 7.29, pCO2 45, pO2 18, oxygen sat 22, bicarbonate 22  UA significant for protein 30, blood urine small amount, leukocyte esterase trace, RBC 16, bacteria few, hyaline cast 40, granular casts 215, RBC casts 26 cocci and also late few.  Negative nitrites.    Past Medical History    No past medical history on file.    Past Surgical History   No past surgical history on file.    Prior to Admission Medications   Prior to Admission Medications   Prescriptions Last Dose Informant Patient Reported? Taking?   ascorbic acid 1000 MG TABS tablet 5/17/2024 at AM  Yes Yes   Sig: Take 1,000 mg by mouth   aspirin (ASA) 325 MG EC tablet 5/17/2024 at AM  Yes Yes   Sig: Take 325 mg by mouth daily   atorvastatin (LIPITOR) 20 MG tablet 5/16/2024 at HS  Yes Yes   Sig: Take 1 tablet by mouth daily   carvedilol (COREG) 12.5 MG tablet 5/17/2024 at AM  Yes Yes   Sig: Take 12.5 mg by mouth 2 times daily   cholecalciferol 50 MCG (2000 UT) CAPS 5/17/2024 at AM  Yes Yes   Sig: Take 2,000 Units by mouth   latanoprost (XALATAN) 0.005 % ophthalmic solution 5/16/2024 at HS  Yes Yes   Sig: Place 1 drop into both eyes at bedtime   lisinopril (ZESTRIL) 40 MG tablet 5/17/2024 at AM  Yes Yes   Sig: Take 20 mg by mouth daily   metFORMIN (GLUCOPHAGE) 1000 MG tablet 5/17/2024 at x1  Yes Yes   Sig:  Take 500 mg by mouth 2 times daily (with meals)   vitamin E (TOCOPHEROL) 400 units (180 mg) capsule 5/17/2024 at AM  Yes Yes   Sig: Take 400 Units by mouth   zinc gluconate 50 MG tablet 5/17/2024 at AM  Yes Yes   Sig: Take 50 mg by mouth daily      Facility-Administered Medications: None        Review of Systems    The 10 point Review of Systems is negative other than noted in the HPI or here.       Physical Exam   Vital Signs: Temp: 97.7  F (36.5  C) Temp src: Oral BP: 118/63 Pulse: 71   Resp: 17 SpO2: 97 % O2 Device: None (Room air)    Weight: 220 lbs 0 oz    GEN: Obese.  Alert, oriented x 3, appears comfortable, NAD.  HEENT:  Normocephalic/atraumatic, no scleral icterus, no nasal discharge, mouth moist.  CV:  Regular rate and rhythm, no murmur or JVD.  S1 + S2 noted, no S3 or S4.  LUNGS:  Clear to auscultation bilaterally without rales/rhonchi/wheezing/retractions.  Symmetric chest rise on inhalation noted.  ABD:  Active bowel sounds, soft, non-tender/non-distended.  No rebound/guarding/rigidity.  EXT:  No edema or cyanosis.  No joint synovitis noted.  SKIN:  Dry to touch, no exanthems noted in the visualized areas.         Medical Decision Making       75 MINUTES SPENT BY ME on the date of service doing chart review, history, exam, documentation & further activities per the note.      Data     I have personally reviewed the following data over the past 24 hrs:    8.6  \   13.5   / 372     139 103 54.3 (H) /  170 (H)   4.7 21 (L) 3.17 (H) \     ALT: 34 AST: 67 (H) AP: 373 (H) TBILI: 0.6   ALB: 3.2 (L) TOT PROTEIN: 6.9 LIPASE: N/A     Trop: 26 (H) BNP: N/A     Procal: N/A CRP: N/A Lactic Acid: 3.3 (H)         Imaging results reviewed over the past 24 hrs:   No results found for this or any previous visit (from the past 24 hour(s)).

## 2024-05-18 LAB
ALBUMIN UR-MCNC: NEGATIVE MG/DL
ANION GAP SERPL CALCULATED.3IONS-SCNC: 11 MMOL/L (ref 7–15)
APPEARANCE UR: CLEAR
BACTERIA #/AREA URNS HPF: ABNORMAL /HPF
BASOPHILS # BLD AUTO: 0.1 10E3/UL (ref 0–0.2)
BASOPHILS NFR BLD AUTO: 1 %
BILIRUB UR QL STRIP: NEGATIVE
BUN SERPL-MCNC: 47.8 MG/DL (ref 8–23)
CALCIUM SERPL-MCNC: 8.1 MG/DL (ref 8.8–10.2)
CHLORIDE SERPL-SCNC: 110 MMOL/L (ref 98–107)
COLOR UR AUTO: ABNORMAL
CREAT SERPL-MCNC: 2.39 MG/DL (ref 0.67–1.17)
CREAT UR-MCNC: 106 MG/DL
DEPRECATED HCO3 PLAS-SCNC: 18 MMOL/L (ref 22–29)
EGFRCR SERPLBLD CKD-EPI 2021: 28 ML/MIN/1.73M2
EOSINOPHIL # BLD AUTO: 0.1 10E3/UL (ref 0–0.7)
EOSINOPHIL NFR BLD AUTO: 2 %
ERYTHROCYTE [DISTWIDTH] IN BLOOD BY AUTOMATED COUNT: 13.8 % (ref 10–15)
GLUCOSE BLDC GLUCOMTR-MCNC: 87 MG/DL (ref 70–99)
GLUCOSE BLDC GLUCOMTR-MCNC: 91 MG/DL (ref 70–99)
GLUCOSE BLDC GLUCOMTR-MCNC: 97 MG/DL (ref 70–99)
GLUCOSE BLDC GLUCOMTR-MCNC: 98 MG/DL (ref 70–99)
GLUCOSE BLDC GLUCOMTR-MCNC: 99 MG/DL (ref 70–99)
GLUCOSE SERPL-MCNC: 84 MG/DL (ref 70–99)
GLUCOSE UR STRIP-MCNC: NEGATIVE MG/DL
HCT VFR BLD AUTO: 36.9 % (ref 40–53)
HGB BLD-MCNC: 12.1 G/DL (ref 13.3–17.7)
HGB UR QL STRIP: NEGATIVE
HYALINE CASTS: 7 /LPF
IMM GRANULOCYTES # BLD: 0.1 10E3/UL
IMM GRANULOCYTES NFR BLD: 1 %
KETONES UR STRIP-MCNC: NEGATIVE MG/DL
LEUKOCYTE ESTERASE UR QL STRIP: NEGATIVE
LYMPHOCYTES # BLD AUTO: 1.7 10E3/UL (ref 0.8–5.3)
LYMPHOCYTES NFR BLD AUTO: 23 %
MCH RBC QN AUTO: 31 PG (ref 26.5–33)
MCHC RBC AUTO-ENTMCNC: 32.8 G/DL (ref 31.5–36.5)
MCV RBC AUTO: 95 FL (ref 78–100)
MICROALBUMIN UR-MCNC: <12 MG/L
MICROALBUMIN/CREAT UR: NORMAL MG/G{CREAT}
MONOCYTES # BLD AUTO: 0.9 10E3/UL (ref 0–1.3)
MONOCYTES NFR BLD AUTO: 12 %
MUCOUS THREADS #/AREA URNS LPF: PRESENT /LPF
NEUTROPHILS # BLD AUTO: 4.4 10E3/UL (ref 1.6–8.3)
NEUTROPHILS NFR BLD AUTO: 61 %
NITRATE UR QL: NEGATIVE
NRBC # BLD AUTO: 0 10E3/UL
NRBC BLD AUTO-RTO: 0 /100
PH UR STRIP: 5 [PH] (ref 5–7)
PLATELET # BLD AUTO: 292 10E3/UL (ref 150–450)
POTASSIUM SERPL-SCNC: 4.3 MMOL/L (ref 3.4–5.3)
RBC # BLD AUTO: 3.9 10E6/UL (ref 4.4–5.9)
RBC URINE: 5 /HPF
SODIUM SERPL-SCNC: 139 MMOL/L (ref 135–145)
SP GR UR STRIP: 1.01 (ref 1–1.03)
SQUAMOUS EPITHELIAL: <1 /HPF
URATE CRY #/AREA URNS HPF: ABNORMAL /HPF
UROBILINOGEN UR STRIP-MCNC: NORMAL MG/DL
WBC # BLD AUTO: 7.1 10E3/UL (ref 4–11)
WBC URINE: 1 /HPF

## 2024-05-18 PROCEDURE — 82043 UR ALBUMIN QUANTITATIVE: CPT | Performed by: INTERNAL MEDICINE

## 2024-05-18 PROCEDURE — 258N000003 HC RX IP 258 OP 636: Performed by: INTERNAL MEDICINE

## 2024-05-18 PROCEDURE — 99222 1ST HOSP IP/OBS MODERATE 55: CPT | Performed by: INTERNAL MEDICINE

## 2024-05-18 PROCEDURE — 85025 COMPLETE CBC W/AUTO DIFF WBC: CPT | Performed by: HOSPITALIST

## 2024-05-18 PROCEDURE — 250N000013 HC RX MED GY IP 250 OP 250 PS 637: Performed by: HOSPITALIST

## 2024-05-18 PROCEDURE — 258N000003 HC RX IP 258 OP 636: Performed by: HOSPITALIST

## 2024-05-18 PROCEDURE — 81001 URINALYSIS AUTO W/SCOPE: CPT | Performed by: INTERNAL MEDICINE

## 2024-05-18 PROCEDURE — 120N000001 HC R&B MED SURG/OB

## 2024-05-18 PROCEDURE — 36415 COLL VENOUS BLD VENIPUNCTURE: CPT | Performed by: HOSPITALIST

## 2024-05-18 PROCEDURE — 80048 BASIC METABOLIC PNL TOTAL CA: CPT | Performed by: HOSPITALIST

## 2024-05-18 PROCEDURE — 99233 SBSQ HOSP IP/OBS HIGH 50: CPT | Performed by: INTERNAL MEDICINE

## 2024-05-18 RX ORDER — SODIUM CHLORIDE 9 MG/ML
INJECTION, SOLUTION INTRAVENOUS CONTINUOUS
Status: ACTIVE | OUTPATIENT
Start: 2024-05-18 | End: 2024-05-19

## 2024-05-18 RX ADMIN — ATORVASTATIN CALCIUM 20 MG: 20 TABLET, FILM COATED ORAL at 21:30

## 2024-05-18 RX ADMIN — SODIUM CHLORIDE: 9 INJECTION, SOLUTION INTRAVENOUS at 07:14

## 2024-05-18 RX ADMIN — ASPIRIN 325 MG: 325 TABLET, COATED ORAL at 08:08

## 2024-05-18 RX ADMIN — Medication 2000 UNITS: at 08:09

## 2024-05-18 RX ADMIN — LATANOPROST 1 DROP: 50 SOLUTION/ DROPS OPHTHALMIC at 21:30

## 2024-05-18 RX ADMIN — SODIUM CHLORIDE: 9 INJECTION, SOLUTION INTRAVENOUS at 10:03

## 2024-05-18 ASSESSMENT — ACTIVITIES OF DAILY LIVING (ADL)
ADLS_ACUITY_SCORE: 18

## 2024-05-18 NOTE — PLAN OF CARE
To Do:  End of Shift Summary  For vital signs and complete assessments, please see documentation flowsheets.     Pertinent assessments: A&o---  up in room and halls  --- denies  pain and nausea ---  appetite good---  Major Shift Events  uneventful  Treatment Plan: monitor  Bedside Nurse: Iram Villarreal RN       Problem: Adult Inpatient Plan of Care  Goal: Plan of Care Review  Description: The Plan of Care Review/Shift note should be completed every shift.  The Outcome Evaluation is a brief statement about your assessment that the patient is improving, declining, or no change.  This information will be displayed automatically on your shift  note.  Outcome: Progressing  Flowsheets (Taken 5/18/2024 1754)  Outcome Evaluation: states feeling better  Plan of Care Reviewed With: patient  Overall Patient Progress: improving  Goal: Absence of Hospital-Acquired Illness or Injury  Intervention: Identify and Manage Fall Risk  Recent Flowsheet Documentation  Taken 5/18/2024 1100 by Iram Villarreal RN  Safety Promotion/Fall Prevention: clutter free environment maintained  Intervention: Prevent Infection  Recent Flowsheet Documentation  Taken 5/18/2024 1100 by Iram Villarreal RN  Infection Prevention: hand hygiene promoted   Goal Outcome Evaluation:      Plan of Care Reviewed With: patient    Overall Patient Progress: improvingOverall Patient Progress: improving    Outcome Evaluation: states feeling better

## 2024-05-18 NOTE — CONSULTS
[facility]    Nephrology Consultation     Date of Admission:  5/17/2024    Assessment & Plan     Demetrius Scruggs is a 73 year old male who was admitted on 5/17/2024.     Assessment:  1) acute kidney injury, nonoliguric:  Clearly related to prerenal state, hypotension with significant treatment since admission within 24 hours with holding blood pressure medicines and given IV fluids.  Still creatinine of baseline but should return in the very near future.    Agree with admission spot urinalysis has abnormalities including reported RBC casts.  Does not think he has a new glomerular disease and will repeat a UA.  No other findings of a more pressing cause toxic results suggest this was not a good sample or analyze probably.    Noted renal ultrasound without postrenal cause.  Also noted normal cortical thickness.  2) chronic kidney disease stage III:  In past seen by Dr. Hood via [facility].  Now seen with lab check on an annual basis due to prior stability.  CKD felt secondary to hypertension past.    3) significant 30 pound weight loss in the last month.  Clearly this is led to the poor sodium intake, hypotension leading to admission.  Unclear etiology of anorexia and weight loss.  Could consider EGD or abdominal imaging.  Noted elevated alkaline phosphatase.        Plan/Recs:  1) agree with holding BP meds, unclear need for additional IV fluids.  2) repeat spot urine today for urinalysis and protein quantification  3) [facility] in a.ad Antony DO  [facility] - Nephrology  [phone]  --------------------------------------------------------------------------------------------  Reason for Consult     I was asked to see the patient for acute kidney injury  \  History is obtained from the patient and chart review.      History of Present Illness     Demetrius Scruggs is a 73 year old male who presents with hypotension.  Patient with known chronic kidney disease stage III,  chronic hypertension on carvedilol and lisinopril.  Patient had significant change in health with gallstone pancreatitis while traveling a month ago in [place].  Had laparoscopic cholecystectomy performed.  Since then has had 30 pounds of weight loss with poor appetite and anorexia.  Intolerant to certain foods such as breads and has been having mostly Jell-O and pudding.  Checks blood pressure daily at home has had a decline in blood pressure.  Earlier in the month his primary decrease his lisinopril from 40 to 20 mg and on his own he decreased his carvedilol.  Presented yesterday with hypotension, acute kidney injury on his CKD.  Patient with IV fluids given overnight, blood pressure medicines held.  His blood pressure have now returned into normal range while inpatient.  Spouse at bedside and they are mostly concerned with the weight loss and poor appetite.    Patient denies any rashes arthralgias or systemic complaints.  No urinary symptoms or hematuria reported.    Past Medical History   I have reviewed this patient's medical history and updated it with pertinent information if needed.   Hypertension  CKD stage III    Past Surgical History   I have reviewed this patient's surgical history and updated it with pertinent information if needed.  Lap bridgette 4/24    Prior to Admission Medications   Prior to Admission Medications   Prescriptions Last Dose Informant Patient Reported? Taking?   ascorbic acid 1000 MG TABS tablet 5/17/2024 at AM  Yes Yes   Sig: Take 1,000 mg by mouth   aspirin (ASA) 325 MG EC tablet 5/17/2024 at AM  Yes Yes   Sig: Take 325 mg by mouth daily   atorvastatin (LIPITOR) 20 MG tablet 5/16/2024 at HS  Yes Yes   Sig: Take 1 tablet by mouth daily   carvedilol (COREG) 12.5 MG tablet 5/17/2024 at AM  Yes Yes   Sig: Take 12.5 mg by mouth 2 times daily   cholecalciferol 50 MCG (2000 UT) CAPS 5/17/2024 at AM  Yes Yes   Sig: Take 2,000 Units by mouth   latanoprost (XALATAN) 0.005 % ophthalmic  solution 5/16/2024 at HS  Yes Yes   Sig: Place 1 drop into both eyes at bedtime   lisinopril (ZESTRIL) 40 MG tablet 5/17/2024 at AM  Yes Yes   Sig: Take 20 mg by mouth daily   metFORMIN (GLUCOPHAGE) 1000 MG tablet 5/17/2024 at x1  Yes Yes   Sig: Take 500 mg by mouth 2 times daily (with meals)   vitamin E (TOCOPHEROL) 400 units (180 mg) capsule 5/17/2024 at AM  Yes Yes   Sig: Take 400 Units by mouth   zinc gluconate 50 MG tablet 5/17/2024 at AM  Yes Yes   Sig: Take 50 mg by mouth daily      Facility-Administered Medications: None     Allergies   No Known Allergies    Social History   I have reviewed this patient's social history and updated it with pertinent information if needed. Demetrius Scruggs      Family History   I have reviewed this patient's family history and updated it with pertinent information if needed.   No family history on file.    Review of Systems   The 10 point Review of Systems is negative other than noted in the HPI.     Physical Exam   Temp: 97.8  F (36.6  C) Temp src: Oral BP: 129/63 Pulse: 65   Resp: 16 SpO2: 97 % O2 Device: None (Room air)    Vital Signs with Ranges  Temp:  [97.7  F (36.5  C)-98.3  F (36.8  C)] 97.8  F (36.6  C)  Pulse:  [62-72] 65  Resp:  [16-18] 16  BP: (118-144)/(63-77) 129/63  SpO2:  [96 %-100 %] 97 %  212 lbs 0 oz    GENERAL: healthy, alert, NAD  HEENT:  Normocephalic. No gross abnormalities.  Pupils equal.   CV: RRR, no murmurs, no clicks, gallops, or rubs, no edema  RESP: Clear bilaterally with good efforts  GI: Abdomen soft/nt/nd, BS normal. No masses, organomegaly  MUSCULOSKELETAL: extremities nl - no gross deformities noted  SKIN: no suspicious lesions or rashes, dry to touch  NEURO: Grossly nonfocal  PSYCH: mood good, affect appropriate    Data   BMP  Recent Labs   Lab 05/18/24  0752 05/18/24  0705 05/18/24  0229 05/17/24  2101 05/17/24  1959 05/17/24  1258   NA  --  139  --   --  138 139   POTASSIUM  --  4.3  --   --   --  4.7   CHLORIDE  --  110*  --   --    "--  103   SHAI  --  8.1*  --   --   --  8.9   CO2  --  18*  --   --   --  21*   BUN  --  47.8*  --   --   --  54.3*   CR  --  2.39*  --   --  2.76* 3.17*   GLC 87 84 91 85  --  170*     Phos@[email or web address](phos:4)  CBC)  Recent Labs   Lab 05/18/24  0705 05/17/24  1258   WBC 7.1 8.6   HGB 12.1* 13.5   HCT 36.9* 41.9   MCV 95 95    372     Recent Labs   Lab 05/17/24  1258   AST 67*   ALT 34   ALKPHOS 373*   BILITOTAL 0.6     No lab results found in last 7 days.  No results found for: \"D2VIT\", \"D3VIT\", \"DTOT\"  Recent Labs   Lab 05/18/24  0705   HGB 12.1*   HCT 36.9*   MCV 95     No results for input(s): \"PTHI\" in the last 168 hours.    "

## 2024-05-18 NOTE — PLAN OF CARE
"End of Shift Summary  For vital signs and complete assessments, please see documentation flowsheets.     Pertinent assessments: Oriented x4, VSS, denies pain or nausea, states no dizziness, up independent in room.     Major Shift Events None    Treatment Plan: IVF, neph consult today    Bedside Nurse: Frida Goode RN   Problem: Adult Inpatient Plan of Care  Goal: Plan of Care Review  Description: The Plan of Care Review/Shift note should be completed every shift.  The Outcome Evaluation is a brief statement about your assessment that the patient is improving, declining, or no change.  This information will be displayed automatically on your shift  note.  Outcome: Progressing  Flowsheets (Taken 5/18/2024 0615)  Outcome Evaluation: BP WNL, Denies dizziness, Up independent  Overall Patient Progress: improving  Goal: Patient-Specific Goal (Individualized)  Description: You can add care plan individualizations to a care plan. Examples of Individualization might be:  \"Parent requests to be called daily at 9am for status\", \"I have a hard time hearing out of my right ear\", or \"Do not touch me to wake me up as it startles  me\".  Outcome: Progressing  Goal: Absence of Hospital-Acquired Illness or Injury  Outcome: Progressing  Intervention: Identify and Manage Fall Risk  Recent Flowsheet Documentation  Taken 5/17/2024 2200 by Frida Goode RN  Safety Promotion/Fall Prevention:   clutter free environment maintained   lighting adjusted   nonskid shoes/slippers when out of bed   safety round/check completed  Intervention: Prevent Skin Injury  Recent Flowsheet Documentation  Taken 5/17/2024 2200 by Frida Goode RN  Body Position: position changed independently  Intervention: Prevent Infection  Recent Flowsheet Documentation  Taken 5/17/2024 2200 by Frida Goode RN  Infection Prevention: hand hygiene promoted  Goal: Optimal Comfort and Wellbeing  Outcome: Progressing  Goal: Readiness for Transition of " Care  Outcome: Progressing     Problem: Pain Acute  Goal: Optimal Pain Control and Function  Outcome: Progressing   Goal Outcome Evaluation:           Overall Patient Progress: improvingOverall Patient Progress: improving    Outcome Evaluation: BP WNL, Denies dizziness, Up independent

## 2024-05-18 NOTE — PROGRESS NOTES
"[facility]    Medicine Progress Note - Hospitalist Service    Date of Admission:  5/17/2024    Assessment & Plan   73-year-old man with past medical history of type 2 diabetes mellitus, CKD stage II, hypertension and dyslipidemia was admitted with complaints of weakness unintentional weight loss and change in taste.  Was found to have evidence of renal failure with creatinine greater than 3, baseline creatinine being in the 1.7-2.1 range  1/.  Acute on chronic renal failure: Appears to be prerenal in nature.  Improved with hydration.  Continue with IV fluids for another 2 L recheck labs in the morning.  Renal ultrasound is reassuring for post renal obstruction.  2/.  Hypotension patient has been on 2 medications will continue to hold lisinopril may resume beta-blocker tomorrow morning.  3/.  Type 2 diabetes mellitus metformin on hold sliding scale insulin and Accu-Cheks.  May or may not require metformin in the short term future.  4/.  Abnormal UA.  Appreciate nephrology input repeat UA is very reassuring.  5/.  Weight loss: Unintentional and rather rapid.  May need an outpatient workup            Diet: Renal Diet (non-dialysis)    DVT Prophylaxis: Heparin SQ  Gallagher Catheter: Not present  Lines: None     Cardiac Monitoring: None  Code Status: Full Code      Clinically Significant Risk Factors Present on Admission          # Hypocalcemia: Lowest Ca = 8.1 mg/dL in last 2 days, will monitor and replace as appropriate     # Hypoalbuminemia: Lowest albumin = 3.2 g/dL at 5/17/2024 12:58 PM, will monitor as appropriate   # Drug Induced Platelet Defect: home medication list includes an antiplatelet medication   # Hypertension: Home medication list includes antihypertensive(s)      # Obesity: Estimated body mass index is 30.42 kg/m  as calculated from the following:    Height as of this encounter: 1.778 m (5' 10\").    Weight as of this encounter: 96.2 kg (212 lb).              Disposition Plan "     Medically Ready for Discharge: Anticipated Tomorrow             Vikki Mendez MD  Hospitalist Service  [facility]  Securely message with [employer] (more info)  Text page via [employer] Paging/Directory   ______________________________________________________________________    Interval History   No acute issues overnight actually feels much better this morning, seen in the presence of his wife and overall care discussed with nephrology also.  IV fluids did not result in improvement in his creatinine but not quite at baseline  He tells me that he used to see a nephrologist in the past but things are going well hence he discontinued seeing them.  According to him all his issues started after his laparoscopic cholecystectomy which was done in [place].  I was able to review the operative note but did not see any labs which were undertaken at that point in time.  Seems like patient's creatinine has gradually crept up over the course of last couple of months.  He tells me his taste is a little better today than it used to be in the past    Physical Exam   Vital Signs: Temp: 97.8  F (36.6  C) Temp src: Oral BP: 129/63 Pulse: 65   Resp: 16 SpO2: 97 % O2 Device: None (Room air)    Weight: 212 lbs 0 oz    General Appearance: Alert awake oriented x 3  Respiratory: Clear to auscultation  Cardiovascular: S1-S2  GI: Soft nontender bowel sounds are present  Skin: No rash or lesions  Other: No lower extremity edema, no neurological deficits      Medical Decision Making       50 MINUTES SPENT BY ME on the date of service doing chart review, history, exam, documentation & further activities per the note.      Data   Current Facility-Administered Medications   Medication Dose Route Frequency Provider Last Rate Last Admin    aspirin (ASA) EC tablet 325 mg  325 mg Oral Daily Dawson Stroud MD   325 mg at 05/18/24 0808    atorvastatin (LIPITOR) tablet 20 mg  20 mg Oral At Bedtime Dawson Stroud MD   20 mg  at 05/17/24 2115    insulin aspart (NovoLOG) injection (RAPID ACTING)  1-7 Units Subcutaneous TID AC Dawson Stroud MD        insulin aspart (NovoLOG) injection (RAPID ACTING)  1-5 Units Subcutaneous At Bedtime Dawson Stroud MD        latanoprost (XALATAN) 0.005 % ophthalmic solution 1 drop  1 drop Both Eyes At Bedtime Dawson Stroud MD   1 drop at 05/17/24 2115    sodium chloride (PF) 0.9% PF flush 3 mL  3 mL Intracatheter Q8H Dawson Stroud MD   3 mL at 05/18/24 0810    Vitamin D3 (CHOLECALCIFEROL) tablet 2,000 Units  2,000 Units Oral Daily with breakfast Dawson Stroud MD   2,000 Units at 05/18/24 0809       ------------------------- PAST 24 HR DATA REVIEWED -----------------------------------------------    I have personally reviewed the following data over the past 24 hrs:    7.1  \   12.1 (L)   / 292     139 110 (H) 47.8 (H) /  87   4.3 18 (L) 2.39 (H) \     ALT: 34 AST: 67 (H) AP: 373 (H) TBILI: 0.6   ALB: 3.2 (L) TOT PROTEIN: 6.9 LIPASE: N/A     Trop: 22 BNP: N/A     TSH: N/A T4: N/A A1C: 6.2 (H)     Procal: N/A CRP: N/A Lactic Acid: 1.7         Imaging results reviewed over the past 24 hrs:   Recent Results (from the past 24 hour(s))   US Renal Complete Non-Vascular    Narrative    EXAM: US RENAL COMPLETE NON-VASCULAR  LOCATION: [facility]  DATE: 5/17/2024    INDICATION: ZENAIDA on CKD  COMPARISON: None.  TECHNIQUE: Routine Bilateral Renal and Bladder Ultrasound.    FINDINGS:    RIGHT KIDNEY: 10.6 cm. Normal cortical thickness and echogenicity. No hydronephrosis.     LEFT KIDNEY: 9.8 cm. Normal cortical thickness and echogenicity. No hydronephrosis.     BLADDER: Only partially visualized but grossly unremarkable.      Impression    IMPRESSION:  1.  No hydronephrosis.

## 2024-05-18 NOTE — UTILIZATION REVIEW
Admission Status; Secondary Review Determination       Under the authority of the Utilization Management Committee, the utilization review process indicated a secondary review on the above patient. The review outcome is based on review of the medical records, discussions with staff, and applying clinical experience noted on the date of the review.     (x) Inpatient Status Appropriate - This patient's medical care is consistent with medical management for inpatient care and reasonable inpatient medical practice.     RATIONALE FOR DETERMINATION     Patient requires inpatient admission versus short stay observation or outpatient treatment for the following reasons:  73 year old male who presents to the emergency department with report of low blood pressure, feeling weak and lightheaded found to have an acute kidney injury with baseline creatinine of 2 up to 3.2. Currently needing continued intravenous fluid replacement for > 2 midnights due to persistent kidney injury. Also with 30 lbs of unintended weight loss. Agree with inpatient status per CMS guidelines.    The expected length of stay at the time of admission was more than 2 nights because of the severity of illness, intensity of service provided, and risk for adverse outcome. Inpatient admission is appropriate.         This document was produced using voice recognition software       The information on this document is developed by the utilization review team in order for the business office to ensure compliance. This only denotes the appropriateness of proper admission status and does not reflect the quality of care rendered.   The definitions of Inpatient Status and Observation Status used in making the determination above are those provided in the CMS Coverage Manual, Chapter 1 and Chapter 6, section 70.4.   Sincerely,   Andrew Rizzo DO  Physician Advisor  [facility].

## 2024-05-19 LAB
ALBUMIN SERPL BCG-MCNC: 2.7 G/DL (ref 3.5–5.2)
ALP SERPL-CCNC: 329 U/L (ref 40–150)
ALT SERPL W P-5'-P-CCNC: 30 U/L (ref 0–70)
ANION GAP SERPL CALCULATED.3IONS-SCNC: 9 MMOL/L (ref 7–15)
AST SERPL W P-5'-P-CCNC: 45 U/L (ref 0–45)
BILIRUB SERPL-MCNC: 0.5 MG/DL
BUN SERPL-MCNC: 34.1 MG/DL (ref 8–23)
CALCIUM SERPL-MCNC: 8.4 MG/DL (ref 8.8–10.2)
CHLORIDE SERPL-SCNC: 107 MMOL/L (ref 98–107)
CREAT SERPL-MCNC: 1.96 MG/DL (ref 0.67–1.17)
DEPRECATED HCO3 PLAS-SCNC: 20 MMOL/L (ref 22–29)
EGFRCR SERPLBLD CKD-EPI 2021: 35 ML/MIN/1.73M2
GLUCOSE BLDC GLUCOMTR-MCNC: 103 MG/DL (ref 70–99)
GLUCOSE BLDC GLUCOMTR-MCNC: 112 MG/DL (ref 70–99)
GLUCOSE BLDC GLUCOMTR-MCNC: 89 MG/DL (ref 70–99)
GLUCOSE BLDC GLUCOMTR-MCNC: 94 MG/DL (ref 70–99)
GLUCOSE SERPL-MCNC: 99 MG/DL (ref 70–99)
POTASSIUM SERPL-SCNC: 4.6 MMOL/L (ref 3.4–5.3)
PROT SERPL-MCNC: 5.9 G/DL (ref 6.4–8.3)
SODIUM SERPL-SCNC: 136 MMOL/L (ref 135–145)

## 2024-05-19 PROCEDURE — 250N000013 HC RX MED GY IP 250 OP 250 PS 637: Performed by: HOSPITALIST

## 2024-05-19 PROCEDURE — 36415 COLL VENOUS BLD VENIPUNCTURE: CPT | Performed by: INTERNAL MEDICINE

## 2024-05-19 PROCEDURE — 99233 SBSQ HOSP IP/OBS HIGH 50: CPT | Performed by: INTERNAL MEDICINE

## 2024-05-19 PROCEDURE — 120N000001 HC R&B MED SURG/OB

## 2024-05-19 PROCEDURE — 250N000013 HC RX MED GY IP 250 OP 250 PS 637: Performed by: INTERNAL MEDICINE

## 2024-05-19 PROCEDURE — 80053 COMPREHEN METABOLIC PANEL: CPT | Performed by: INTERNAL MEDICINE

## 2024-05-19 RX ORDER — CARVEDILOL 6.25 MG/1
6.25 TABLET ORAL 2 TIMES DAILY WITH MEALS
Status: DISCONTINUED | OUTPATIENT
Start: 2024-05-19 | End: 2024-05-20 | Stop reason: HOSPADM

## 2024-05-19 RX ADMIN — LATANOPROST 1 DROP: 50 SOLUTION/ DROPS OPHTHALMIC at 21:24

## 2024-05-19 RX ADMIN — Medication 2000 UNITS: at 08:11

## 2024-05-19 RX ADMIN — CARVEDILOL 6.25 MG: 6.25 TABLET, FILM COATED ORAL at 17:48

## 2024-05-19 RX ADMIN — CARVEDILOL 6.25 MG: 6.25 TABLET, FILM COATED ORAL at 10:34

## 2024-05-19 RX ADMIN — ASPIRIN 325 MG: 325 TABLET, COATED ORAL at 08:11

## 2024-05-19 ASSESSMENT — ACTIVITIES OF DAILY LIVING (ADL)
ADLS_ACUITY_SCORE: 18

## 2024-05-19 NOTE — PLAN OF CARE
To Do:  End of Shift Summary  For vital signs and complete assessments, please see documentation flowsheets.     Pertinent assessments:  A&O---  Up I room and halls--- denies pain - nausea and dizziness---appetite good   Major Shift Events meds   Treatment Plan: monitor  Bedside Nurse: Iram Villarreal RN       Problem: Adult Inpatient Plan of Care  Goal: Plan of Care Review  Description: The Plan of Care Review/Shift note should be completed every shift.  The Outcome Evaluation is a brief statement about your assessment that the patient is improving, declining, or no change.  This information will be displayed automatically on your shift  note.  Recent Flowsheet Documentation  Taken 5/19/2024 1620 by Iram Villarreal RN  Outcome Evaluation: monitor dizziness and vital signs  Plan of Care Reviewed With: patient  Overall Patient Progress: improving  Goal: Absence of Hospital-Acquired Illness or Injury  Intervention: Prevent Infection  Recent Flowsheet Documentation  Taken 5/19/2024 1000 by Iram Villarreal RN  Infection Prevention: hand hygiene promoted   Goal Outcome Evaluation:      Plan of Care Reviewed With: patient    Overall Patient Progress: improvingOverall Patient Progress: improving    Outcome Evaluation: states feeling better

## 2024-05-19 NOTE — PROGRESS NOTES
[facility]    Medicine Progress Note - Hospitalist Service    Date of Admission:  5/17/2024    Assessment & Plan   73-year-old man with past medical history of type 2 diabetes mellitus, CKD stage II, systolic congestive heart failure with last known EF of 45% with history of ischemic heart disease, hypertension and dyslipidemia was admitted with complaints of weakness unintentional weight loss and change in taste.  Was found to have evidence of renal failure with creatinine greater than 3, baseline creatinine being in the 1.7-2.1 range      1/.  Acute kidney injury on top of CKD, nonoliguric    -Continues to show improvement with decreasing azotemia and improvement with serum creatinine.   -Voiding freely.  Denies any urinary symptoms such as frequency or dysuria.  No reported gross hematuria  -I appreciate input from nephrology service  Renal ultrasound is reassuring for post renal obstruction.    2/.  Hypotension patient has been on 2 medications will continue to hold lisinopril may resume beta-blocker tomorrow morning.  -Patient's PCP recently adjusted his and hypertensives been decreasing in dose given soft blood pressure levels noted earlier  -I will continue to hold patient's ACE inhibitor's given recent ZENAIDA and history of CKD with recent episodes of hypotension  -I am resuming patient's Coreg but decreasing dose now to 6.25 mg twice daily and continue to monitor while on this regimen  -Recommending for him to have a close follow-up with his PCP and likely will benefit for a repeat echocardiogram to see any improvement with his previously known systolic congestive heart failure see if there is any improvement with his EF.  -Consideration of further use of ACE inhibitors if still with decreased EF as currently this is on hold given recent ZENAIDA, azotemia and hypotension    3/.  Type 2 diabetes mellitus metformin on hold sliding scale insulin and Accu-Cheks.    -Currently showing euglycemia  "while off with his oral antidiabetic regimen  -With his underlying weight loss and recent ZENAIDA and elevated creatinine he is currently not a candidate for resumption for his metformin    4/.  Abnormal UA.  Appreciate nephrology input repeat UA is very reassuring.  5/.  Weight loss: Unintentional and rather rapid.  May need an outpatient workup  -Hopefully if he demonstrates stable improvement with his creatinine and improvement with his azotemia will help with his appetite.  -Not a candidate for CT imaging with contrast given elevated creatinine  -No issues with swallowing no complaints of abdominal symptoms such as nausea or vomiting but agree that he would likely benefit for further workup may need EGD if not improving with his weight loss and appetite            Diet: Renal Diet (non-dialysis)    DVT Prophylaxis: Pneumatic Compression Devices and Ambulate every shift  Gallagher Catheter: Not present  Lines: None     Cardiac Monitoring: None  Code Status: Full Code      Clinically Significant Risk Factors              # Hypoalbuminemia: Lowest albumin = 2.7 g/dL at 5/19/2024  7:05 AM, will monitor as appropriate            # Obesity: Estimated body mass index is 30.42 kg/m  as calculated from the following:    Height as of this encounter: 1.778 m (5' 10\").    Weight as of this encounter: 96.2 kg (212 lb)., PRESENT ON ADMISSION            Disposition Plan     Medically Ready for Discharge: Anticipated Tomorrow             Beck Santiago MD, MD  Hospitalist Service  [facility]  Securely message with [employer] (more info)  Text page via [facility] Paging/Directory   ______________________________________________________________________    Interval History   I assumed medicine service care today.  Seen and examined.  Chart reviewed.  Case discussed with nursing service.  Family updated as patient's wife present at bedside during my encounter.  I have met this gentleman while he is sitting comfortably " in the hospital recliner.  He denies any ongoing lightheadedness or dizziness.  Continues to demonstrate stable hemodynamics with no further issues of hypotension.  Blood pressure has been creeping up yesterday.  Afebrile.  Not requiring oxygen support.  Able to tolerate minimal oral intake with no nausea or vomiting.  Passing flatus.  Voiding freely.  No reports of any nausea or vomiting or abdominal pain.    Physical Exam   Vital Signs: Temp: 98.4  F (36.9  C) Temp src: Oral BP: 135/69 Pulse: 63   Resp: 16 SpO2: 97 % O2 Device: None (Room air)    Weight: 212 lbs 0 oz    HEENT; Atraumatic, normocephalic, pinkish conjuctiva, pupils bilateral reactive   Skin: warm and moist, no rashes  Lungs: equal chest expansion, clear to auscultation, no wheezes, no stridor, no crackles,   Heart: normal rate, normal rhythm, no rubs or gallops.   Abdomen: normal bowel sounds, no tenderness, no peritoneal signs, no guarding  Extremities: no deformities, no edema   Neuro; follow commands, alert and oriented x3, spontaneous speech, coherent, moves all extremities spontaneously  Psych; no hallucination, euthymic mood, not agitated      Medical Decision Making       50 MINUTES SPENT BY ME on the date of service doing chart review, history, exam, documentation & further activities per the note.  MANAGEMENT DISCUSSED with the following over the past 24 hours: Yes   NOTE(S)/MEDICAL RECORDS REVIEWED over the past 24 hours: Yes       Data     I have personally reviewed the following data over the past 24 hrs:    N/A  \   N/A   / N/A     136 107 34.1 (H) /  99   4.6 20 (L) 1.96 (H) \     ALT: 30 AST: 45 AP: 329 (H) TBILI: 0.5   ALB: 2.7 (L) TOT PROTEIN: 5.9 (L) LIPASE: N/A       Imaging results reviewed over the past 24 hrs:   No results found for this or any previous visit (from the past 24 hour(s)).

## 2024-05-19 NOTE — PLAN OF CARE
"End of Shift Summary  For vital signs and complete assessments, please see documentation flowsheets.     Pertinent assessments: VSS, denies pain, no dizziness, up independent to BR.     Major Shift Events None    Treatment Plan: Discharge today    Bedside Nurse: Frida Goode RN   Problem: Adult Inpatient Plan of Care  Goal: Plan of Care Review  Description: The Plan of Care Review/Shift note should be completed every shift.  The Outcome Evaluation is a brief statement about your assessment that the patient is improving, declining, or no change.  This information will be displayed automatically on your shift  note.  Outcome: Adequate for Care Transition  Flowsheets (Taken 5/19/2024 0551)  Outcome Evaluation: Stable VS  Plan of Care Reviewed With: patient  Overall Patient Progress: improving  Goal: Patient-Specific Goal (Individualized)  Description: You can add care plan individualizations to a care plan. Examples of Individualization might be:  \"Parent requests to be called daily at 9am for status\", \"I have a hard time hearing out of my right ear\", or \"Do not touch me to wake me up as it startles  me\".  Outcome: Adequate for Care Transition  Goal: Absence of Hospital-Acquired Illness or Injury  Outcome: Adequate for Care Transition  Intervention: Identify and Manage Fall Risk  Recent Flowsheet Documentation  Taken 5/18/2024 2200 by Frida Goode RN  Safety Promotion/Fall Prevention:   clutter free environment maintained   safety round/check completed   nonskid shoes/slippers when out of bed  Intervention: Prevent Skin Injury  Recent Flowsheet Documentation  Taken 5/18/2024 2200 by Frida Goode RN  Body Position: position changed independently  Intervention: Prevent Infection  Recent Flowsheet Documentation  Taken 5/18/2024 2200 by Frida Goode RN  Infection Prevention: hand hygiene promoted  Goal: Optimal Comfort and Wellbeing  Outcome: Adequate for Care Transition  Goal: Readiness for " Transition of Care  Outcome: Adequate for Care Transition     Problem: Pain Acute  Goal: Optimal Pain Control and Function  Outcome: Adequate for Care Transition   Goal Outcome Evaluation:      Plan of Care Reviewed With: patient    Overall Patient Progress: improvingOverall Patient Progress: improving    Outcome Evaluation: Stable VS

## 2024-05-19 NOTE — PROGRESS NOTES
Nephrology Chart check    Notes, labs reviewed.    Creatinine today 1.96, likely approaching baseline GFR.  Repeat UA with some granular casts but no rbc casts. Notable 6 rbc's/hpf still abnormal.     Will sign off, call with concerns or questions.  Should have repeat UA to see if having persistent microscopic hematuria and if present, urology evaluation.    Adalid Antony, DO

## 2024-05-20 VITALS
OXYGEN SATURATION: 97 % | DIASTOLIC BLOOD PRESSURE: 67 MMHG | TEMPERATURE: 97.8 F | RESPIRATION RATE: 16 BRPM | HEIGHT: 70 IN | HEART RATE: 63 BPM | BODY MASS INDEX: 30.11 KG/M2 | WEIGHT: 210.3 LBS | SYSTOLIC BLOOD PRESSURE: 139 MMHG

## 2024-05-20 LAB
ANION GAP SERPL CALCULATED.3IONS-SCNC: 12 MMOL/L (ref 7–15)
BUN SERPL-MCNC: 29.4 MG/DL (ref 8–23)
CALCIUM SERPL-MCNC: 8.5 MG/DL (ref 8.8–10.2)
CHLORIDE SERPL-SCNC: 110 MMOL/L (ref 98–107)
CREAT SERPL-MCNC: 1.76 MG/DL (ref 0.67–1.17)
DEPRECATED HCO3 PLAS-SCNC: 18 MMOL/L (ref 22–29)
EGFRCR SERPLBLD CKD-EPI 2021: 40 ML/MIN/1.73M2
GLUCOSE BLDC GLUCOMTR-MCNC: 101 MG/DL (ref 70–99)
GLUCOSE SERPL-MCNC: 98 MG/DL (ref 70–99)
POTASSIUM SERPL-SCNC: 4.9 MMOL/L (ref 3.4–5.3)
SODIUM SERPL-SCNC: 140 MMOL/L (ref 135–145)

## 2024-05-20 PROCEDURE — 250N000013 HC RX MED GY IP 250 OP 250 PS 637: Performed by: HOSPITALIST

## 2024-05-20 PROCEDURE — 36415 COLL VENOUS BLD VENIPUNCTURE: CPT | Performed by: INTERNAL MEDICINE

## 2024-05-20 PROCEDURE — 99239 HOSP IP/OBS DSCHRG MGMT >30: CPT | Performed by: INTERNAL MEDICINE

## 2024-05-20 PROCEDURE — 80048 BASIC METABOLIC PNL TOTAL CA: CPT | Performed by: INTERNAL MEDICINE

## 2024-05-20 PROCEDURE — 250N000013 HC RX MED GY IP 250 OP 250 PS 637: Performed by: INTERNAL MEDICINE

## 2024-05-20 RX ORDER — CARVEDILOL 6.25 MG/1
6.25 TABLET ORAL 2 TIMES DAILY WITH MEALS
Qty: 60 TABLET | Refills: 0 | Status: SHIPPED | OUTPATIENT
Start: 2024-05-20

## 2024-05-20 RX ADMIN — Medication 2000 UNITS: at 08:20

## 2024-05-20 RX ADMIN — ASPIRIN 325 MG: 325 TABLET, COATED ORAL at 08:20

## 2024-05-20 RX ADMIN — CARVEDILOL 6.25 MG: 6.25 TABLET, FILM COATED ORAL at 08:20

## 2024-05-20 ASSESSMENT — ACTIVITIES OF DAILY LIVING (ADL)
ADLS_ACUITY_SCORE: 18

## 2024-05-20 NOTE — PLAN OF CARE
"Patient up independently, denies dizziness. Discharge instructions given to patient and spouse. All questions answered.    Problem: Comorbidity Management  Goal: Maintenance of Asthma Control  Outcome: Met  Goal: Maintenance of Behavioral Health Symptom Control  Outcome: Met  Goal: Maintenance of COPD Symptom Control  Outcome: Met  Goal: Blood Glucose Levels Within Targeted Range  Outcome: Met  Goal: Maintenance of Heart Failure Symptom Control  Outcome: Met  Goal: Blood Pressure in Desired Range  Outcome: Met  Goal: Maintenance of Osteoarthritis Symptom Control  Outcome: Met  Intervention: Maintain Osteoarthritis Symptom Control  Recent Flowsheet Documentation  Taken 5/20/2024 0818 by Bhargavi Cox RN  Activity Management: up ad evie  Goal: Bariatric Home Regimen Maintained  Outcome: Met  Goal: Maintenance of Seizure Control  Outcome: Met     Problem: Fall Injury Risk  Goal: Absence of Fall and Fall-Related Injury  Outcome: Met  Intervention: Promote Injury-Free Environment  Recent Flowsheet Documentation  Taken 5/20/2024 0818 by Bhargavi Cox RN  Safety Promotion/Fall Prevention:   clutter free environment maintained   lighting adjusted   safety round/check completed     Problem: Adult Inpatient Plan of Care  Goal: Plan of Care Review  Description: The Plan of Care Review/Shift note should be completed every shift.  The Outcome Evaluation is a brief statement about your assessment that the patient is improving, declining, or no change.  This information will be displayed automatically on your shift  note.  Outcome: Met  Flowsheets (Taken 5/20/2024 0953)  Outcome Evaluation: VSS. Denies dizziness.  Plan of Care Reviewed With:   patient   spouse  Overall Patient Progress: improving  Goal: Patient-Specific Goal (Individualized)  Description: You can add care plan individualizations to a care plan. Examples of Individualization might be:  \"Parent requests to be called daily at 9am for status\", \"I have a hard " "time hearing out of my right ear\", or \"Do not touch me to wake me up as it startles  me\".  Outcome: Met  Goal: Absence of Hospital-Acquired Illness or Injury  Outcome: Met  Intervention: Identify and Manage Fall Risk  Recent Flowsheet Documentation  Taken 5/20/2024 0818 by Bhargavi Cox RN  Safety Promotion/Fall Prevention:   clutter free environment maintained   lighting adjusted   safety round/check completed  Intervention: Prevent Skin Injury  Recent Flowsheet Documentation  Taken 5/20/2024 0818 by Bhargavi Cox RN  Body Position: position changed independently  Intervention: Prevent Infection  Recent Flowsheet Documentation  Taken 5/20/2024 0818 by Bhargavi Cox RN  Infection Prevention: hand hygiene promoted  Goal: Optimal Comfort and Wellbeing  Outcome: Met  Goal: Readiness for Transition of Care  Outcome: Met   Goal Outcome Evaluation:      Plan of Care Reviewed With: patient, spouse    Overall Patient Progress: improvingOverall Patient Progress: improving    Outcome Evaluation: VSS. Denies dizziness.      "

## 2024-05-20 NOTE — CONSULTS
"CLINICAL NUTRITION SERVICES  -  ASSESSMENT NOTE      Recommendations:   - ***  - ***     MALNUTRITION:  % Weight Loss:  {:417270}  % Intake:  {:045062}  Subcutaneous Fat Loss:  {:899860} --> not using as indicator with only 1 region present ***  Muscle Loss:  {:041528}  Fluid Retention:  {:900390} --> not using as indicator given do not suspect masking true wt trends ***    Malnutrition Diagnosis: {:419416}  In Context of:  {In Context of:921636}          REASON FOR ASSESSMENT  Demetrius Scruggs is a 73 year old male seen by Registered Dietitian for Malnutrition Screening Tool (MST).     PMH of: CKD stage 3, DMII, cholecystectomy in April.      Admit 2/2: ZENAIDA.    NUTRITION HISTORY  - Information obtained from ***  - Diet at home: ***  - Usual intakes: ***  - Barriers to PO intakes: ***  - Use of oral supplements: ***  - Chewing/swallowing issues: ***  - Meal preparation/grocery shopping: ***  - Allergies: NKFA.      CURRENT NUTRITION ORDERS  Diet Order:     {:509967}   ***  Room service with assist ***    Current Intake/Tolerance:  ***-***% intakes based on flowsheet review.      ANTHROPOMETRICS  Height: 5' 10\"  Weight: 210 lbs 4.8 oz  Body mass index is 30.17 kg/m .  Weight Status:  Obesity Grade I BMI 30-34.9  Weight History:  Wt Readings from Last 10 Encounters:   05/20/24 95.4 kg (210 lb 4.8 oz)     - No current documentation of edema.  - ***    LABS: Reviewed.    MEDICATIONS: Reviewed.    GI: Stooling patterns noted. ***    SKIN: No current documentation of PI. ***      ASSESSED NUTRITION NEEDS PER APPROVED PRACTICE GUIDELINES:    Dosing Weight ***  Estimated Energy Needs: *** kcals - {:242017}  Justification: {:044514}  Estimated Protein Needs: *** grams protein - {:802788}  Justification: {:722487}  Estimated Fluid Needs: per MD      NUTRITION DIAGNOSIS:  {:262090} related to *** as evidenced by ***  Predicted inadequate nutrient intake (energy/protein) related to ***    NUTRITION " INTERVENTIONS  Recommendations / Nutrition Prescription  See above.    Implementation  Nutrition education: {:531732}    Medical Food Supplement: As above. ***    Multivitamin/Mineral: As above. ***    Collaboration and Referral of Nutrition care: Discussed POC with team during rounds.  ***    {Interventions:394247}    Nutrition goals:  ***    Patient to consume at least ***-***% of meals ***or supplements*** ***.     MONITORING AND EVALUATION:  Progress towards goals will be monitored and evaluated per protocol and Practice Guidelines          Estela Zeng RDN, LD  Clinical Dietitian  3rd floor/ICU: [phone]  All other floors: [phone]  Weekend/holiday: [phone]  Office: [phone]

## 2024-05-20 NOTE — DISCHARGE SUMMARY
"[facility]  Hospitalist Discharge Summary      Date of Admission:  5/17/2024  Date of Discharge:  5/20/2024  Discharging Provider: Beck Santiago MD, MD  Discharge Service: Hospitalist Service    Discharge Diagnoses   Nonoliguric ZENAIDA on top of CKD stage III  Hypovolemic hypotension  Benign essential hypertension  Non-insulin-requiring diabetes mellitus type 2    Clinically Significant Risk Factors     # Obesity: Estimated body mass index is 30.17 kg/m  as calculated from the following:    Height as of this encounter: 1.778 m (5' 10\").    Weight as of this encounter: 95.4 kg (210 lb 4.8 oz).       Follow-ups Needed After Discharge   Follow-up Appointments     Follow-up and recommended labs and tests       Follow up with primary care provider, [facility], within 7 days   to evaluate medication change, to evaluate treatment change, and for   hospital follow- up.  The following labs/tests are recommended: .  Repeat   BMP  Please bring your recorded glucose monitoring log upon follow-up as your   metformin was placed on hold and may need resumption of oral antidiabetic   regimen upon follow-up   Will need further workup for your unintentional weight loss especially if   your appetite has been improving and still having significant weight gain              Unresulted Labs Ordered in the Past 30 Days of this Admission       Date and Time Order Name Status Description    5/17/2024  1:02 PM Blood Culture Arm, Right Preliminary     5/17/2024  1:02 PM Blood Culture Wrist, Left Preliminary         These results will be followed up by primary care physician    Discharge Disposition   Discharged to home  Condition at discharge: Stable    Hospital Course     Doing medicine service care.  No significant events reported overnight.  Continues to demonstrate stable hemodynamics.  Stable glucose levels.  No issues this morning.  Hopeful for hospital discharge.  Tolerating oral diet with no nausea " or, vomiting or abdominal pain.  No reported diarrhea.  Voiding freely.  Much improved kidney function.    Admitted earlier due to dizziness and lightheadedness along with hypotension and accompanying ZENAIDA on top of CKD  Most recent creatinine improving back to baseline level.  Stable hemodynamics with no further recurrence of hypotension  Antihypertensives modified.  Held patient's ACE inhibitor's.  Decrease Coreg further down to 6.25 mg twice daily dosing  Needs to closely follow-up with PCP with repeat metabolic panel.  Continue to hold ACE inhibitor upon discharge.  Holding as well metformin due to elevated creatinine.  Patient's glucose levels within acceptable ranges but needs close follow-up care and may resume oral diabetic regimen upon follow-up if creatinine remained permitting    I will refer you to excerpts of prior progress notes as listed below for other details of his hospitalization stay:      73-year-old man with past medical history of type 2 diabetes mellitus, CKD stage II, systolic congestive heart failure with last known EF of 45% with history of ischemic heart disease, hypertension and dyslipidemia was admitted with complaints of weakness unintentional weight loss and change in taste.  Was found to have evidence of renal failure with creatinine greater than 3, baseline creatinine being in the 1.7-2.1 range      1/.  Acute kidney injury on top of CKD, nonoliguric    -Continues to show improvement with decreasing azotemia and improvement with serum creatinine.   -Voiding freely.  Denies any urinary symptoms such as frequency or dysuria.  No reported gross hematuria  -I appreciate input from nephrology service  Renal ultrasound is reassuring for post renal obstruction.    2/.  Hypotension patient has been on 2 medications will continue to hold lisinopril may resume beta-blocker tomorrow morning.  -Patient's PCP recently adjusted his and hypertensives been decreasing in dose given soft blood  pressure levels noted earlier  -I will continue to hold patient's ACE inhibitor's given recent ZENAIDA and history of CKD with recent episodes of hypotension  -I am resuming patient's Coreg but decreasing dose now to 6.25 mg twice daily and continue to monitor while on this regimen  -Recommending for him to have a close follow-up with his PCP and likely will benefit for a repeat echocardiogram to see any improvement with his previously known systolic congestive heart failure see if there is any improvement with his EF.  -Consideration of further use of ACE inhibitors if still with decreased EF as currently this is on hold given recent ZENAIDA, azotemia and hypotension    3/.  Type 2 diabetes mellitus metformin on hold sliding scale insulin and Accu-Cheks.    -Currently showing euglycemia while off with his oral antidiabetic regimen  -With his underlying weight loss and recent ZENAIDA and elevated creatinine he is currently not a candidate for resumption for his metformin    4/.  Abnormal UA.  Appreciate nephrology input repeat UA is very reassuring.  5/.  Weight loss: Unintentional and rather rapid.  May need an outpatient workup  -Hopefully if he demonstrates stable improvement with his creatinine and improvement with his azotemia will help with his appetite.  -Not a candidate for CT imaging with contrast given elevated creatinine  -No issues with swallowing no complaints of abdominal symptoms such as nausea or vomiting but agree that he would likely benefit for further workup may need EGD if not improving with his weight loss and appetite      Consultations This Hospital Stay   NEPHROLOGY IP CONSULT    Code Status   Full Code    Time Spent on this Encounter   Beck VYAS MD, MD, personally saw the patient today and spent greater than 30 minutes discharging this patient.       Beck Santiago MD, MD  [place] [facility] [address] [facility]  [address] [place] [address]  Phone:  [phone]  Fax: [phone]  ______________________________________________________________________    Physical Exam   Vital Signs: Temp: 97.8  F (36.6  C) Temp src: Oral BP: 139/67 Pulse: 63   Resp: 16 SpO2: 97 % O2 Device: None (Room air)    Weight: 210 lbs 4.8 oz  HEENT; Atraumatic, normocephalic, pinkish conjuctiva, pupils bilateral reactive   Skin: warm and moist, no rashes  Lymphatics: no cervical or axillary lymphandenopathy  Lungs: equal chest expansion, clear to auscultation, no wheezes, no stridor, no crackles,   Heart: normal rate, normal rhythm, no rubs or gallops.   Abdomen: normal bowel sounds, no tenderness, no peritoneal signs, no guarding  Extremities: no deformities, no edema   Neuro; follow commands, alert and oriented x3, spontaneous speech, coherent, moves all extremities spontaneously  Psych; no hallucination, euthymic mood, not agitated         Primary Care Physician   [facility]    Discharge Orders      Reason for your hospital stay    Admitted earlier due to dizziness and lightheadedness along with hypotension and accompanying ZENAIDA on top of CKD  Most recent creatinine improving back to baseline level.  Stable hemodynamics with no further recurrence of hypotension  Antihypertensives modified.  Held patient's ACE inhibitor's.  Decrease Coreg further down to 6.25 mg twice daily dosing  Needs to closely follow-up with PCP with repeat metabolic panel.  Continue to hold ACE inhibitor upon discharge.  Holding as well metformin due to elevated creatinine.  Patient's glucose levels within acceptable ranges but needs close follow-up care and may resume oral diabetic regimen upon follow-up if creatinine remained permitting     Activity    Your activity upon discharge: activity as tolerated     Follow-up and recommended labs and tests     Follow up with primary care provider, [facility], within 7 days to evaluate medication change, to evaluate treatment change, and for hospital  follow- up.  The following labs/tests are recommended: .  Repeat BMP  Please bring your recorded glucose monitoring log upon follow-up as your metformin was placed on hold and may need resumption of oral antidiabetic regimen upon follow-up   Will need further workup for your unintentional weight loss especially if your appetite has been improving and still having significant weight gain     Full Code     Diet    Follow this diet upon discharge: moderate CHO diet       Significant Results and Procedures   Most Recent 3 CBC's:  Recent Labs   Lab Test 05/18/24  0705 05/17/24  1258 02/26/23  1317   WBC 7.1 8.6 7.8   HGB 12.1* 13.5 14.9   MCV 95 95 98    372 208     Most Recent 3 BMP's:  Recent Labs   Lab Test 05/20/24  0745 05/20/24  0133 05/19/24 2123 05/19/24  1215 05/19/24  0705 05/18/24  0752 05/18/24  0705     --   --   --  136  --  139   POTASSIUM 4.9  --   --   --  4.6  --  4.3   CHLORIDE 110*  --   --   --  107  --  110*   CO2 18*  --   --   --  20*  --  18*   BUN 29.4*  --   --   --  34.1*  --  47.8*   CR 1.76*  --   --   --  1.96*  --  2.39*   ANIONGAP 12  --   --   --  9  --  11   SHAI 8.5*  --   --   --  8.4*  --  8.1*   GLC 98 101* 112*   < > 99   < > 84    < > = values in this interval not displayed.     Most Recent Cholesterol Panel:No lab results found.  7-Day Micro Results       Collected Updated Procedure Result Status      05/17/2024 1330 05/19/2024 1546 Blood Culture Wrist, Left [[id number]]   Blood from Wrist, Left    Preliminary result Component Value   Culture No growth after 2 days  [P]                05/17/2024 1312 05/19/2024 1546 Blood Culture Arm, Right [[id number]]   Blood from Arm, Right    Preliminary result Component Value   Culture No growth after 2 days  [P]                      Most Recent TSH and T4:No lab results found.  Most Recent Hemoglobin A1c:  Recent Labs   Lab Test 05/17/24  1258   A1C 6.2*     Most Recent 6 glucoses:  Recent Labs   Lab Test 05/20/24  6799  05/20/24  0133 05/19/24  2123 05/19/24  1739 05/19/24  1215 05/19/24  0705   GLC 98 101* 112* 89 103* 99     Most Recent Urinalysis:  Recent Labs   Lab Test 05/18/24  1128   COLOR Light Yellow   APPEARANCE Clear   URINEGLC Negative   URINEBILI Negative   URINEKETONE Negative   SG 1.015   UBLD Negative   URINEPH 5.0   PROTEIN Negative   NITRITE Negative   LEUKEST Negative   RBCU 5*   WBCU 1   ,   Results for orders placed or performed during the hospital encounter of 05/17/24   US Renal Complete Non-Vascular    Narrative    EXAM: US RENAL COMPLETE NON-VASCULAR  LOCATION: [facility]  DATE: 5/17/2024    INDICATION: ZENAIDA on CKD  COMPARISON: None.  TECHNIQUE: Routine Bilateral Renal and Bladder Ultrasound.    FINDINGS:    RIGHT KIDNEY: 10.6 cm. Normal cortical thickness and echogenicity. No hydronephrosis.     LEFT KIDNEY: 9.8 cm. Normal cortical thickness and echogenicity. No hydronephrosis.     BLADDER: Only partially visualized but grossly unremarkable.      Impression    IMPRESSION:  1.  No hydronephrosis.       Discharge Medications   Current Discharge Medication List        CONTINUE these medications which have CHANGED    Details   carvedilol (COREG) 6.25 MG tablet Take 1 tablet (6.25 mg) by mouth 2 times daily (with meals)  Qty: 60 tablet, Refills: 0    Associated Diagnoses: ZENAIDA (acute kidney injury) (H24)           CONTINUE these medications which have NOT CHANGED    Details   ascorbic acid 1000 MG TABS tablet Take 1,000 mg by mouth      aspirin (ASA) 325 MG EC tablet Take 325 mg by mouth daily      atorvastatin (LIPITOR) 20 MG tablet Take 1 tablet by mouth daily      cholecalciferol 50 MCG (2000 UT) CAPS Take 2,000 Units by mouth      latanoprost (XALATAN) 0.005 % ophthalmic solution Place 1 drop into both eyes at bedtime      vitamin E (TOCOPHEROL) 400 units (180 mg) capsule Take 400 Units by mouth      zinc gluconate 50 MG tablet Take 50 mg by mouth daily           STOP taking these  medications       lisinopril (ZESTRIL) 40 MG tablet Comments:   Reason for Stopping: ZENAIDA, elevated creatinine, hypotension        metFORMIN (GLUCOPHAGE) 1000 MG tablet Comments:   Reason for Stopping: ZENAIDA, elevated creatinine            Allergies   No Known Allergies

## 2024-05-22 LAB
BACTERIA BLD CULT: NO GROWTH
BACTERIA BLD CULT: NO GROWTH

## 2025-01-16 VITALS
SYSTOLIC BLOOD PRESSURE: 180 MMHG | RESPIRATION RATE: 18 BRPM | HEART RATE: 61 BPM | OXYGEN SATURATION: 97 % | WEIGHT: 235.89 LBS | BODY MASS INDEX: 33.85 KG/M2 | TEMPERATURE: 97.6 F | DIASTOLIC BLOOD PRESSURE: 95 MMHG

## 2025-01-16 LAB
ALBUMIN SERPL BCG-MCNC: 4.2 G/DL (ref 3.5–5.2)
ALP SERPL-CCNC: 109 U/L (ref 40–150)
ALT SERPL W P-5'-P-CCNC: 34 U/L (ref 0–70)
ANION GAP SERPL CALCULATED.3IONS-SCNC: 12 MMOL/L (ref 7–15)
AST SERPL W P-5'-P-CCNC: 36 U/L (ref 0–45)
BASOPHILS # BLD AUTO: 0.1 10E3/UL (ref 0–0.2)
BASOPHILS NFR BLD AUTO: 1 %
BILIRUB SERPL-MCNC: 0.9 MG/DL
BUN SERPL-MCNC: 35.1 MG/DL (ref 8–23)
CALCIUM SERPL-MCNC: 9.2 MG/DL (ref 8.8–10.4)
CHLORIDE SERPL-SCNC: 106 MMOL/L (ref 98–107)
CREAT SERPL-MCNC: 1.98 MG/DL (ref 0.67–1.17)
EGFRCR SERPLBLD CKD-EPI 2021: 35 ML/MIN/1.73M2
EOSINOPHIL # BLD AUTO: 0.2 10E3/UL (ref 0–0.7)
EOSINOPHIL NFR BLD AUTO: 3 %
ERYTHROCYTE [DISTWIDTH] IN BLOOD BY AUTOMATED COUNT: 13.5 % (ref 10–15)
GLUCOSE SERPL-MCNC: 148 MG/DL (ref 70–99)
HCO3 SERPL-SCNC: 23 MMOL/L (ref 22–29)
HCT VFR BLD AUTO: 46.1 % (ref 40–53)
HGB BLD-MCNC: 15.5 G/DL (ref 13.3–17.7)
IMM GRANULOCYTES # BLD: 0 10E3/UL
IMM GRANULOCYTES NFR BLD: 0 %
LYMPHOCYTES # BLD AUTO: 1.4 10E3/UL (ref 0.8–5.3)
LYMPHOCYTES NFR BLD AUTO: 25 %
MCH RBC QN AUTO: 30.5 PG (ref 26.5–33)
MCHC RBC AUTO-ENTMCNC: 33.6 G/DL (ref 31.5–36.5)
MCV RBC AUTO: 91 FL (ref 78–100)
MONOCYTES # BLD AUTO: 0.7 10E3/UL (ref 0–1.3)
MONOCYTES NFR BLD AUTO: 12 %
NEUTROPHILS # BLD AUTO: 3.5 10E3/UL (ref 1.6–8.3)
NEUTROPHILS NFR BLD AUTO: 60 %
NRBC # BLD AUTO: 0 10E3/UL
NRBC BLD AUTO-RTO: 0 /100
PLATELET # BLD AUTO: 195 10E3/UL (ref 150–450)
POTASSIUM SERPL-SCNC: 4.8 MMOL/L (ref 3.4–5.3)
PROT SERPL-MCNC: 6.8 G/DL (ref 6.4–8.3)
RBC # BLD AUTO: 5.08 10E6/UL (ref 4.4–5.9)
SODIUM SERPL-SCNC: 141 MMOL/L (ref 135–145)
WBC # BLD AUTO: 5.8 10E3/UL (ref 4–11)

## 2025-01-16 PROCEDURE — 82435 ASSAY OF BLOOD CHLORIDE: CPT | Performed by: EMERGENCY MEDICINE

## 2025-01-16 PROCEDURE — 82310 ASSAY OF CALCIUM: CPT | Performed by: EMERGENCY MEDICINE

## 2025-01-16 PROCEDURE — 36415 COLL VENOUS BLD VENIPUNCTURE: CPT | Performed by: EMERGENCY MEDICINE

## 2025-01-16 PROCEDURE — 80053 COMPREHEN METABOLIC PANEL: CPT | Performed by: EMERGENCY MEDICINE

## 2025-01-16 PROCEDURE — 85004 AUTOMATED DIFF WBC COUNT: CPT | Performed by: EMERGENCY MEDICINE

## 2025-01-16 PROCEDURE — 99285 EMERGENCY DEPT VISIT HI MDM: CPT | Mod: 25

## 2025-01-16 PROCEDURE — 85027 COMPLETE CBC AUTOMATED: CPT | Performed by: EMERGENCY MEDICINE

## 2025-01-16 PROCEDURE — 84155 ASSAY OF PROTEIN SERUM: CPT | Performed by: EMERGENCY MEDICINE

## 2025-01-16 ASSESSMENT — COLUMBIA-SUICIDE SEVERITY RATING SCALE - C-SSRS
2. HAVE YOU ACTUALLY HAD ANY THOUGHTS OF KILLING YOURSELF IN THE PAST MONTH?: NO
6. HAVE YOU EVER DONE ANYTHING, STARTED TO DO ANYTHING, OR PREPARED TO DO ANYTHING TO END YOUR LIFE?: NO
1. IN THE PAST MONTH, HAVE YOU WISHED YOU WERE DEAD OR WISHED YOU COULD GO TO SLEEP AND NOT WAKE UP?: NO

## 2025-01-17 ENCOUNTER — HOSPITAL ENCOUNTER (EMERGENCY)
Facility: CLINIC | Age: 75
End: 2025-01-17
Attending: EMERGENCY MEDICINE
Payer: MEDICARE

## 2025-01-17 ENCOUNTER — APPOINTMENT (OUTPATIENT)
Dept: CT IMAGING | Facility: CLINIC | Age: 75
End: 2025-01-17
Attending: EMERGENCY MEDICINE
Payer: MEDICARE

## 2025-01-17 VITALS
RESPIRATION RATE: 18 BRPM | TEMPERATURE: 97.6 F | SYSTOLIC BLOOD PRESSURE: 159 MMHG | OXYGEN SATURATION: 96 % | BODY MASS INDEX: 33.85 KG/M2 | HEART RATE: 65 BPM | DIASTOLIC BLOOD PRESSURE: 80 MMHG | WEIGHT: 235.89 LBS

## 2025-01-17 DIAGNOSIS — N13.30 HYDRONEPHROSIS, UNSPECIFIED HYDRONEPHROSIS TYPE: ICD-10-CM

## 2025-01-17 LAB
ALBUMIN UR-MCNC: NEGATIVE MG/DL
APPEARANCE UR: CLEAR
BILIRUB UR QL STRIP: NEGATIVE
COLOR UR AUTO: ABNORMAL
GLUCOSE UR STRIP-MCNC: NEGATIVE MG/DL
HGB UR QL STRIP: ABNORMAL
HOLD SPECIMEN: NORMAL
HOLD SPECIMEN: NORMAL
KETONES UR STRIP-MCNC: NEGATIVE MG/DL
LEUKOCYTE ESTERASE UR QL STRIP: NEGATIVE
MUCOUS THREADS #/AREA URNS LPF: PRESENT /LPF
NITRATE UR QL: NEGATIVE
PH UR STRIP: 5 [PH] (ref 5–7)
RBC URINE: 53 /HPF
SP GR UR STRIP: 1.02 (ref 1–1.03)
UROBILINOGEN UR STRIP-MCNC: NORMAL MG/DL
WBC URINE: 1 /HPF

## 2025-01-17 PROCEDURE — 96374 THER/PROPH/DIAG INJ IV PUSH: CPT

## 2025-01-17 PROCEDURE — 250N000011 HC RX IP 250 OP 636: Performed by: EMERGENCY MEDICINE

## 2025-01-17 PROCEDURE — 96375 TX/PRO/DX INJ NEW DRUG ADDON: CPT

## 2025-01-17 PROCEDURE — 96361 HYDRATE IV INFUSION ADD-ON: CPT

## 2025-01-17 PROCEDURE — 258N000003 HC RX IP 258 OP 636: Performed by: EMERGENCY MEDICINE

## 2025-01-17 PROCEDURE — 81001 URINALYSIS AUTO W/SCOPE: CPT | Performed by: EMERGENCY MEDICINE

## 2025-01-17 PROCEDURE — 74176 CT ABD & PELVIS W/O CONTRAST: CPT

## 2025-01-17 RX ORDER — KETOROLAC TROMETHAMINE 15 MG/ML
15 INJECTION, SOLUTION INTRAMUSCULAR; INTRAVENOUS ONCE
Status: COMPLETED | OUTPATIENT
Start: 2025-01-17 | End: 2025-01-17

## 2025-01-17 RX ORDER — ONDANSETRON 2 MG/ML
4 INJECTION INTRAMUSCULAR; INTRAVENOUS ONCE
Status: COMPLETED | OUTPATIENT
Start: 2025-01-17 | End: 2025-01-17

## 2025-01-17 RX ADMIN — ONDANSETRON 4 MG: 2 INJECTION INTRAMUSCULAR; INTRAVENOUS at 00:29

## 2025-01-17 RX ADMIN — KETOROLAC TROMETHAMINE 15 MG: 15 INJECTION, SOLUTION INTRAMUSCULAR; INTRAVENOUS at 00:25

## 2025-01-17 RX ADMIN — SODIUM CHLORIDE 1000 ML: 9 INJECTION, SOLUTION INTRAVENOUS at 00:25

## 2025-01-17 ASSESSMENT — ACTIVITIES OF DAILY LIVING (ADL)
ADLS_ACUITY_SCORE: 48

## 2025-01-17 NOTE — ED TRIAGE NOTES
Pt to ER w c/o L-sided flank pain that started around 1900 tonight. Endorses blood in urine, N/V. Rates pain 8/10. Hx of kidney stones. VSS except hypertensive 180/95, ABCs intact, A&Ox4.

## 2025-01-17 NOTE — ED PROVIDER NOTES
Emergency Department Note      Code Status: Prior    History of Present Illness     Chief Complaint:  Flank Pain       HPI   Demetrius Scruggs is a 74 year old male who presents with his wife due to left groin pain and hematuria.  The patient states that around 7 PM tonight he noted acute left-sided (left groin area) discomfort.  He adds that this discomfort radiates to his back.  He notes blood in the urine today as well as nausea and vomiting prior to arriving at the ED.  He states he has a history of kidney stones though the last was a couple of years ago.  Patient has no known allergies to medications.     Independent Historian:    Wife as detailed above. She corroborates the history    Review of External Notes  DC summary 5/20/24: ZENAIDA on CKD III; Hypovolemic hypotension, HTN, NIDDM    Past Medical History   Medical History, Surgical History, Problem List, and Medications  Reviewed in Epic    Physical Exam   Patient Vitals for the past 24 hrs:   BP Temp Temp src Pulse Resp SpO2 Weight   01/17/25 0229 (!) 159/80 -- -- 65 -- 96 % --   01/17/25 0124 (!) 161/78 -- -- -- -- 97 % --   01/17/25 0104 (!) 163/78 -- -- 61 -- 98 % --   01/17/25 0044 -- -- -- -- -- 97 % --   01/17/25 0034 (!) 175/82 -- -- -- -- 97 % --   01/16/25 2313 (!) 180/95 97.6  F (36.4  C) Oral 61 18 97 % --   01/16/25 2310 -- -- -- -- -- -- 107 kg (235 lb 14.3 oz)       Physical Exam  Constitutional: Vital signs reviewed as above.   Eyes: PEERL, EOMI B/L  Neck: No JVD noted. FROM   Cardiovascular: normal rate, Regular rhythm and normal heart sounds.  No murmur heard. Equal B/L peripheral pulses.  Pulmonary/Chest: Effort normal and breath sounds normal. No respiratory distress. Patient has no wheezes. Patient has no rales.   Gastrointestinal: Soft. There is LLQ tenderness.   Musculoskeletal/Extremities: No left flank tenderness to percussion.  Skin: Skin is warm and dry. There is no diaphoresis noted.   Psychiatric: The patient appears calm.      Diagnostics     Laboratory: Imaging:   Labs Ordered and Resulted from Time of ED Arrival to Time of ED Departure   COMPREHENSIVE METABOLIC PANEL - Abnormal       Result Value    Sodium 141      Potassium 4.8      Carbon Dioxide (CO2) 23      Anion Gap 12      Urea Nitrogen 35.1 (*)     Creatinine 1.98 (*)     GFR Estimate 35 (*)     Calcium 9.2      Chloride 106      Glucose 148 (*)     Alkaline Phosphatase 109      AST 36      ALT 34      Protein Total 6.8      Albumin 4.2      Bilirubin Total 0.9     ROUTINE UA WITH MICROSCOPIC REFLEX TO CULTURE - Abnormal    Color Urine Light Yellow      Appearance Urine Clear      Glucose Urine Negative      Bilirubin Urine Negative      Ketones Urine Negative      Specific Gravity Urine 1.019      Blood Urine Large (*)     pH Urine 5.0      Protein Albumin Urine Negative      Urobilinogen Urine Normal      Nitrite Urine Negative      Leukocyte Esterase Urine Negative      Mucus Urine Present (*)     RBC Urine 53 (*)     WBC Urine 1     CBC WITH PLATELETS AND DIFFERENTIAL    WBC Count 5.8      RBC Count 5.08      Hemoglobin 15.5      Hematocrit 46.1      MCV 91      MCH 30.5      MCHC 33.6      RDW 13.5      Platelet Count 195      % Neutrophils 60      % Lymphocytes 25      % Monocytes 12      % Eosinophils 3      % Basophils 1      % Immature Granulocytes 0      NRBCs per 100 WBC 0      Absolute Neutrophils 3.5      Absolute Lymphocytes 1.4      Absolute Monocytes 0.7      Absolute Eosinophils 0.2      Absolute Basophils 0.1      Absolute Immature Granulocytes 0.0      Absolute NRBCs 0.0       Abd/pelvis CT no contrast - Stone Protocol   Final Result   IMPRESSION:    1. Probable recently passed left ureteral calculus: The left ureter is mildly dilated, there is mild left perinephric and periureteric stranding, and there is a 0.3 cm calculus in the urinary bladder lumen and no visualized left ureteral calculus.   2. No other cause of acute pain identified in the abdomen or  pelvis.   3. 0.5 cm nonobstructing calculus in the upper pole of the left kidney.             EKG   None    Independent Interpretation  See ED course    ED Course    Medications Administered  Medications   sodium chloride 0.9% BOLUS 1,000 mL (0 mLs Intravenous Stopped 1/17/25 0255)   ketorolac (TORADOL) injection 15 mg (15 mg Intravenous $Given 1/17/25 0025)   ondansetron (ZOFRAN) injection 4 mg (4 mg Intravenous $Given 1/17/25 0029)       Procedures  Procedures     Discussion of Management  See ED Course    ED Course  ED Course as of 01/17/25 0656   Fri Jan 17, 2025   0014 I obtained history and examined the patient as noted above.     0256 Rechecked       Optional/Additional Documentation: None    Medical Decision Making / Diagnosis     MIPS  CT for PE was ordered because the patient is high risk for pulmonary embolism.    Medical Decision Making:  Demetrius Scruggs is a 74 year old male presented to the Emergency Department with a complaint of left groin area pain. The workup in the ED did demonstrate a 3 mm renal stone in the bladder with left sided hydronephrosis/hydroureter. The urine is non-infected. At this time, the patient's pain has been controlled. I believe the patient can be discharged and can follow up in the outpatient setting. I have encouraged close Primary Care Physician follow up. If symptoms persist, Urology evaluation will be warranted. I have encouraged the patient to return to the ED for symptoms such as intractable pain, fever, and persistent vomiting. Anticipatory guidance given prior to discharge.     Critical Care:  None.    Disposition:  See ED Course and MDM    ICD-10 Codes:    ICD-10-CM    1. Hydronephrosis, unspecified hydronephrosis type  N13.30            Discharge Medications:  Discharge Medication List as of 1/17/2025  3:03 AM           1/17/2025   Wilner Alcantara,      [employer]     I, Kinsey Saez, am serving as a scribe at 11:02  PM on 1/16/2025 to document services personally performed by Wilner Alcantara DO based on my observations and the provider's statements to me.        Wilner Alcantara DO  01/17/25 0657

## 2025-07-07 ENCOUNTER — APPOINTMENT (OUTPATIENT)
Dept: CT IMAGING | Facility: CLINIC | Age: 75
End: 2025-07-07
Attending: STUDENT IN AN ORGANIZED HEALTH CARE EDUCATION/TRAINING PROGRAM
Payer: MEDICARE

## 2025-07-07 ENCOUNTER — HOSPITAL ENCOUNTER (EMERGENCY)
Facility: CLINIC | Age: 75
Discharge: HOME | End: 2025-07-07
Attending: STUDENT IN AN ORGANIZED HEALTH CARE EDUCATION/TRAINING PROGRAM
Payer: MEDICARE

## 2025-07-07 VITALS
TEMPERATURE: 97.6 F | HEART RATE: 74 BPM | WEIGHT: 233.03 LBS | DIASTOLIC BLOOD PRESSURE: 84 MMHG | SYSTOLIC BLOOD PRESSURE: 145 MMHG | BODY MASS INDEX: 33.44 KG/M2 | RESPIRATION RATE: 18 BRPM | OXYGEN SATURATION: 98 %

## 2025-07-07 DIAGNOSIS — K52.9 PROCTOCOLITIS: ICD-10-CM

## 2025-07-07 DIAGNOSIS — R33.9 URINARY RETENTION: ICD-10-CM

## 2025-07-07 LAB
ALBUMIN UR-MCNC: NEGATIVE MG/DL
ANION GAP SERPL CALCULATED.3IONS-SCNC: 11 MMOL/L (ref 7–15)
APPEARANCE UR: CLEAR
BASOPHILS # BLD AUTO: 0 10E3/UL (ref 0–0.2)
BASOPHILS NFR BLD AUTO: 0 %
BILIRUB UR QL STRIP: NEGATIVE
BUN SERPL-MCNC: 26.7 MG/DL (ref 8–23)
CALCIUM SERPL-MCNC: 9.4 MG/DL (ref 8.8–10.4)
CHLORIDE SERPL-SCNC: 106 MMOL/L (ref 98–107)
COLOR UR AUTO: ABNORMAL
CREAT SERPL-MCNC: 1.59 MG/DL (ref 0.67–1.17)
EGFRCR SERPLBLD CKD-EPI 2021: 45 ML/MIN/1.73M2
EOSINOPHIL # BLD AUTO: 0 10E3/UL (ref 0–0.7)
EOSINOPHIL NFR BLD AUTO: 0 %
ERYTHROCYTE [DISTWIDTH] IN BLOOD BY AUTOMATED COUNT: 13.4 % (ref 10–15)
GLUCOSE SERPL-MCNC: 148 MG/DL (ref 70–99)
GLUCOSE UR STRIP-MCNC: NEGATIVE MG/DL
HCO3 SERPL-SCNC: 22 MMOL/L (ref 22–29)
HCT VFR BLD AUTO: 47.7 % (ref 40–53)
HGB BLD-MCNC: 16.5 G/DL (ref 13.3–17.7)
HGB UR QL STRIP: ABNORMAL
HOLD SPECIMEN: NORMAL
HOLD SPECIMEN: NORMAL
IMM GRANULOCYTES # BLD: 0.1 10E3/UL
IMM GRANULOCYTES NFR BLD: 1 %
KETONES UR STRIP-MCNC: NEGATIVE MG/DL
LACTATE SERPL-SCNC: 1.6 MMOL/L (ref 0.7–2)
LEUKOCYTE ESTERASE UR QL STRIP: NEGATIVE
LYMPHOCYTES # BLD AUTO: 0.8 10E3/UL (ref 0.8–5.3)
LYMPHOCYTES NFR BLD AUTO: 5 %
MCH RBC QN AUTO: 31.4 PG (ref 26.5–33)
MCHC RBC AUTO-ENTMCNC: 34.6 G/DL (ref 31.5–36.5)
MCV RBC AUTO: 91 FL (ref 78–100)
MONOCYTES # BLD AUTO: 0.7 10E3/UL (ref 0–1.3)
MONOCYTES NFR BLD AUTO: 4 %
MUCOUS THREADS #/AREA URNS LPF: PRESENT /LPF
NEUTROPHILS # BLD AUTO: 13.9 10E3/UL (ref 1.6–8.3)
NEUTROPHILS NFR BLD AUTO: 90 %
NITRATE UR QL: NEGATIVE
NRBC # BLD AUTO: 0 10E3/UL
NRBC BLD AUTO-RTO: 0 /100
PH UR STRIP: 5 [PH] (ref 5–7)
PLATELET # BLD AUTO: 217 10E3/UL (ref 150–450)
POTASSIUM SERPL-SCNC: 5 MMOL/L (ref 3.4–5.3)
RBC # BLD AUTO: 5.25 10E6/UL (ref 4.4–5.9)
RBC URINE: 36 /HPF
SODIUM SERPL-SCNC: 139 MMOL/L (ref 135–145)
SP GR UR STRIP: 1.02 (ref 1–1.03)
UROBILINOGEN UR STRIP-MCNC: NORMAL MG/DL
WBC # BLD AUTO: 15.5 10E3/UL (ref 4–11)
WBC URINE: 5 /HPF

## 2025-07-07 PROCEDURE — 99285 EMERGENCY DEPT VISIT HI MDM: CPT | Mod: 25

## 2025-07-07 PROCEDURE — 51702 INSERT TEMP BLADDER CATH: CPT

## 2025-07-07 PROCEDURE — 74177 CT ABD & PELVIS W/CONTRAST: CPT

## 2025-07-07 PROCEDURE — 51798 US URINE CAPACITY MEASURE: CPT

## 2025-07-07 PROCEDURE — 83605 ASSAY OF LACTIC ACID: CPT | Performed by: STUDENT IN AN ORGANIZED HEALTH CARE EDUCATION/TRAINING PROGRAM

## 2025-07-07 PROCEDURE — 250N000013 HC RX MED GY IP 250 OP 250 PS 637: Performed by: STUDENT IN AN ORGANIZED HEALTH CARE EDUCATION/TRAINING PROGRAM

## 2025-07-07 PROCEDURE — 80048 BASIC METABOLIC PNL TOTAL CA: CPT | Performed by: STUDENT IN AN ORGANIZED HEALTH CARE EDUCATION/TRAINING PROGRAM

## 2025-07-07 PROCEDURE — 81001 URINALYSIS AUTO W/SCOPE: CPT | Performed by: STUDENT IN AN ORGANIZED HEALTH CARE EDUCATION/TRAINING PROGRAM

## 2025-07-07 PROCEDURE — 85004 AUTOMATED DIFF WBC COUNT: CPT | Performed by: STUDENT IN AN ORGANIZED HEALTH CARE EDUCATION/TRAINING PROGRAM

## 2025-07-07 PROCEDURE — 250N000009 HC RX 250: Performed by: STUDENT IN AN ORGANIZED HEALTH CARE EDUCATION/TRAINING PROGRAM

## 2025-07-07 PROCEDURE — 36415 COLL VENOUS BLD VENIPUNCTURE: CPT | Performed by: STUDENT IN AN ORGANIZED HEALTH CARE EDUCATION/TRAINING PROGRAM

## 2025-07-07 PROCEDURE — 250N000011 HC RX IP 250 OP 636: Performed by: STUDENT IN AN ORGANIZED HEALTH CARE EDUCATION/TRAINING PROGRAM

## 2025-07-07 RX ORDER — LIDOCAINE HYDROCHLORIDE 20 MG/ML
6 JELLY TOPICAL ONCE
Status: DISCONTINUED | OUTPATIENT
Start: 2025-07-07 | End: 2025-07-07 | Stop reason: HOSPADM

## 2025-07-07 RX ORDER — IOPAMIDOL 755 MG/ML
500 INJECTION, SOLUTION INTRAVASCULAR ONCE
Status: COMPLETED | OUTPATIENT
Start: 2025-07-07 | End: 2025-07-07

## 2025-07-07 RX ADMIN — SODIUM CHLORIDE 65 ML: 9 INJECTION, SOLUTION INTRAVENOUS at 18:53

## 2025-07-07 RX ADMIN — AMOXICILLIN AND CLAVULANATE POTASSIUM 1 TABLET: 875; 125 TABLET, FILM COATED ORAL at 20:03

## 2025-07-07 RX ADMIN — IOPAMIDOL 100 ML: 755 INJECTION, SOLUTION INTRAVENOUS at 18:53

## 2025-07-07 ASSESSMENT — ACTIVITIES OF DAILY LIVING (ADL)
ADLS_ACUITY_SCORE: 48

## 2025-07-07 ASSESSMENT — COLUMBIA-SUICIDE SEVERITY RATING SCALE - C-SSRS
1. IN THE PAST MONTH, HAVE YOU WISHED YOU WERE DEAD OR WISHED YOU COULD GO TO SLEEP AND NOT WAKE UP?: NO
2. HAVE YOU ACTUALLY HAD ANY THOUGHTS OF KILLING YOURSELF IN THE PAST MONTH?: NO
6. HAVE YOU EVER DONE ANYTHING, STARTED TO DO ANYTHING, OR PREPARED TO DO ANYTHING TO END YOUR LIFE?: NO

## 2025-07-07 NOTE — ED PROVIDER NOTES
Emergency Department Note      History of Present Illness     Chief Complaint   Urinary Retention and Constipation      ROBE Scruggs is a 74 year old male with a history of chronic kidney disease stage 3, hypertension, hyperlipidemia, and type 2 diabetes here for evaluation of urinary retention and constipation. The patient reports onset of constipation and urinary retention this morning. He has tried going to the bathroom, but only very small amounts are coming out. He reports that this has never happened to him before. He states that his abdomen feels full. He reports having normal bowel movements for the past two days. He also reports rectal bleeding, which he attributes to hemorrhoids. He tried Miralax a few hours before presentation, but it has not helped. He denies rectal pain, back pain, and fevers. He reports that he had a kidney stone in the past, and he had one sitting in his kidney from the past. He endorses being on Ozempic.    Independent Historian   None    Review of External Notes   none    Past Medical History     Medical History and Problem List   Chronic kidney disease stage 3  Hypertension  Hyperlipidemia  Coronary atherosclerosis  Systolic dysfunction  Type 2 diabetes mellitus  Acute kidney injury  Orthostatic hypotension    Medications   Aspirin 325 mg  Lipitor  Coreg  Cozaar  Ozempic    Physical Exam     Patient Vitals for the past 24 hrs:   BP Temp Temp src Pulse Resp SpO2 Weight   07/07/25 2030 (!) 145/84 -- -- 74 18 98 % --   07/07/25 1622 (!) 171/104 -- -- -- -- -- --   07/07/25 1621 -- 97.6  F (36.4  C) Temporal 94 16 98 % 105.7 kg (233 lb 0.4 oz)     Physical Exam  General: Awake, alert, in mild acute distress   HEENT: Atraumatic   EOM normal   External ears normal   Trachea midline  Neck: Supple, normal ROM  CV: Regular rate, regular rhythm   No lower extremity edema  2+ radial and DP pulses  PULM: Breath sounds normal bilaterally  No wheezes or rales  ABD: Soft, mild  suprapubic tenderness, non-distended  Normal bowel sounds   No rebound or guarding   MSK: No gross deformities  NEURO: Alert, no focal deficits  Skin: Warm, dry and intact      Diagnostics     Lab Results   Labs Ordered and Resulted from Time of ED Arrival to Time of ED Departure   BASIC METABOLIC PANEL - Abnormal       Result Value    Sodium 139      Potassium 5.0      Chloride 106      Carbon Dioxide (CO2) 22      Anion Gap 11      Urea Nitrogen 26.7 (*)     Creatinine 1.59 (*)     GFR Estimate 45 (*)     Calcium 9.4      Glucose 148 (*)    ROUTINE UA WITH MICROSCOPIC REFLEX TO CULTURE - Abnormal    Color Urine Light Yellow      Appearance Urine Clear      Glucose Urine Negative      Bilirubin Urine Negative      Ketones Urine Negative      Specific Gravity Urine 1.019      Blood Urine Moderate (*)     pH Urine 5.0      Protein Albumin Urine Negative      Urobilinogen Urine Normal      Nitrite Urine Negative      Leukocyte Esterase Urine Negative      Mucus Urine Present (*)     RBC Urine 36 (*)     WBC Urine 5     CBC WITH PLATELETS AND DIFFERENTIAL - Abnormal    WBC Count 15.5 (*)     RBC Count 5.25      Hemoglobin 16.5      Hematocrit 47.7      MCV 91      MCH 31.4      MCHC 34.6      RDW 13.4      Platelet Count 217      % Neutrophils 90      % Lymphocytes 5      % Monocytes 4      % Eosinophils 0      % Basophils 0      % Immature Granulocytes 1      NRBCs per 100 WBC 0      Absolute Neutrophils 13.9 (*)     Absolute Lymphocytes 0.8      Absolute Monocytes 0.7      Absolute Eosinophils 0.0      Absolute Basophils 0.0      Absolute Immature Granulocytes 0.1      Absolute NRBCs 0.0     LACTIC ACID WHOLE BLOOD WITH 1X REPEAT IN 2 HR WHEN >2 - Normal    Lactic Acid, Initial 1.6         Imaging   CT Abdomen Pelvis w Contrast   Final Result   IMPRESSION:       1.  Findings suspicious for a mild proctocolitis involving the distal sigmoid colon and rectum.      2.  No bowel obstruction or significant stool burden.  Fluid within the ascending and proximal to mid transverse colon, as can be seen with a diarrheal illness or use of stool softeners.      3.  Appropriately positioned Gallagher catheter. No hydronephrosis.      4.  Nonobstructing 8 x 4 mm left upper pole renal calculus. No additional urinary calculi.      5.  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            Independent Interpretation   None    ED Course      Medications Administered   Medications   iopamidol (ISOVUE-370) solution 500 mL (100 mLs Intravenous $Given 7/7/25 0163)   sodium chloride 0.9 % bag for CT scan flush (65 mLs Intravenous $Given 7/7/25 1853)   amoxicillin-clavulanate (AUGMENTIN) 875-125 MG per tablet 1 tablet (1 tablet Oral $Given 7/7/25 2003)       Procedures   Procedures     Discussion of Management   None    ED Course   ED Course as of 07/07/25 2354   Mon Jul 07, 2025   1715 I obtained history and examined the patient as noted above.       Additional Documentation  None    Medical Decision Making / Diagnosis     CMS Diagnoses: None    MIPS   Gallagher catheter was inserted because acute urinary retention/bladder outlet obstruction.    [facility]   Demetriussocorro Scruggs is a 74 year old male urinary retention.  Has 500 cc in the bladder.  Consider differential including obstructive, neurologic.  He is reporting symptoms of constipation though only for the past 1 day.  CT scan does not reveal significant stool burden.  Does show focal swelling in the rectum and lower colon suggestive of proctocolitis.  No history of anal intercourse, STIs.  He has never had a screening colonoscopy.  I discussed with family my concern that this could represent potential malignancy.  Given swelling is likely causing his urinary retention will discharge home with Gallagher catheter in place.  Urine does not appear infected.  Kidney function is at his baseline.  Does have a leukocytosis but lactic is WNL.  Suspect degree of reactive given he was in moderate amount of  discomfort due to urinary retention prior to arrival.  First dose of antibiotic given in ED, strict return precautions.  He knows to come back if he has significant rectal bleeding, bouts of fever, abdominal pain, malfunctioning Gallagher catheter.     Disposition   The patient was discharged.     Diagnosis     ICD-10-CM    1. Proctocolitis  K52.9       2. Urinary retention  R33.9            Discharge Medications   Discharge Medication List as of 7/7/2025  8:24 PM        START taking these medications    Details   amoxicillin-clavulanate (AUGMENTIN) 875-125 MG tablet Take 1 tablet by mouth 2 times daily for 7 days., Disp-13 tablet, R-0, E-Prescribe               Scribe Disclosure:  I, Zane Tucker, am serving as a scribe at 5:12 PM on 7/7/2025 to document services personally performed by Nia Acosta DO based on my observations and the provider's statements to me.        Nia Acosta DO  07/07/25 [id number]

## 2025-07-07 NOTE — ED TRIAGE NOTES
Presents to triage with c/o urinary retention that started today. He has no previous history of retention. Also endorses feeling constipated. Last BM yesterday

## 2025-07-08 NOTE — DISCHARGE INSTRUCTIONS
If at any point your symptoms get worse, you have significant rectal bleeding, you develop fevers, your jenkins stops working, please come back to the ER. Otherwise make an appointment with urology in about 1 week for evaluation for removal of your catheter. Please call your PCP to set up an appointment.

## 2025-07-11 ENCOUNTER — OFFICE VISIT (OUTPATIENT)
Dept: UROLOGY | Facility: CLINIC | Age: 75
End: 2025-07-11
Payer: MEDICARE

## 2025-07-11 VITALS — DIASTOLIC BLOOD PRESSURE: 64 MMHG | SYSTOLIC BLOOD PRESSURE: 124 MMHG

## 2025-07-11 DIAGNOSIS — N20.0 NEPHROLITHIASIS: ICD-10-CM

## 2025-07-11 DIAGNOSIS — R31.29 OTHER MICROSCOPIC HEMATURIA: ICD-10-CM

## 2025-07-11 DIAGNOSIS — N40.1 BENIGN PROSTATIC HYPERPLASIA WITH URINARY RETENTION: Primary | ICD-10-CM

## 2025-07-11 DIAGNOSIS — R33.8 BENIGN PROSTATIC HYPERPLASIA WITH URINARY RETENTION: Primary | ICD-10-CM

## 2025-07-11 PROCEDURE — 3078F DIAST BP <80 MM HG: CPT | Performed by: PHYSICIAN ASSISTANT

## 2025-07-11 PROCEDURE — 3074F SYST BP LT 130 MM HG: CPT | Performed by: PHYSICIAN ASSISTANT

## 2025-07-11 PROCEDURE — 1126F AMNT PAIN NOTED NONE PRSNT: CPT | Performed by: PHYSICIAN ASSISTANT

## 2025-07-11 PROCEDURE — 99203 OFFICE O/P NEW LOW 30 MIN: CPT | Performed by: PHYSICIAN ASSISTANT

## 2025-07-11 RX ORDER — TAMSULOSIN HYDROCHLORIDE 0.4 MG/1
0.4 CAPSULE ORAL DAILY
Qty: 30 CAPSULE | Refills: 11 | Status: SHIPPED | OUTPATIENT
Start: 2025-07-11

## 2025-07-11 ASSESSMENT — PAIN SCALES - GENERAL: PAINLEVEL_OUTOF10: NO PAIN (0)

## 2025-07-11 NOTE — PATIENT INSTRUCTIONS
Will plan on starting Flomax 0.4 mg once daily to help with urination.  This medication works full effect in 1-2 weeks.    Possible side effects include lightheaded and dizziness and retrograde ejaculation (ejaculate goes into the bladder).  Less common side effects include backache and nasal congestion.     Complete your antibiotic, as scheduled.    Will plan on a trial of void on Monday or Tuesday next week.  We hesitant to do today, as if there are issues, you will end up back in urgent care or ER.    If you pass trial of void next week, repeat post void residual in 2-4 weeks.    If you do not pass, can learn intermittent catheterization or have Gallagher replaced for a week.    Will discuss with urologist about possible stone removal given 8 mm size.  If they think it is appropriate, can do cystoscopy at that time.  If they think monitoring is fine, would consider office cystoscopy given blood in the urine.    Contact us in the interim with questions, concerns, or changes in symptomatology.  [phone]

## 2025-07-11 NOTE — NURSING NOTE
Chief Complaint   Patient presents with    Urinary Retention     Pt did not have any urinary difficulty or concerns prior to this recent acute episode of retention.      Lilia Pichardo, Clinic Assistant

## 2025-07-11 NOTE — PROGRESS NOTES
Subjective      REQUESTING PROVIDER   No ref. provider found     REASON FOR CONSULT   Urinary retention    HISTORY OF PRESENT ILLNESS   Mr. Scruggs is a very pleasant, a 74 year old male seen today in the urology clinic for evaluation of urinary retention.  This developed acutely, causing the patient to present to the emergency department on 07/07/2025 for evaluation.  He had had difficulties having a bowel movement and urinating since early that morning.    CT imaging showed concern for mild proctocolitis without significant stool burden.  Gallagher catheter was placed with 700 mL.  Patient was discharged with Augmentin, which he is still taking.  He was not started on any BPH medication.    Prior to this, patient denied any previous issues with urinary retention.  Prior to present to the emergency department, patient denied any issues with hematuria, dysuria, urgency, frequency, or urinary incontinence.  He generally felt like he empties his bladder and only noted occasional nocturia.  He voided previously catheterized urinary stream is adequate and medium in strength.  He denied any intermittency or hesitancy.    CT imaging also showed evidence of a 8 mm stone within the left kidney.  Patient has had episodes of nephrolithiasis x 2.  No previous history of urinary tract infection.  Urinalysis showed microscopic hematuria, which has been persistent on previous urinalysis.  Patient is a former smoker.  He does take full-strength aspirin daily.    The following portions of the patient's history were reviewed and updated as appropriate: allergies, current medications, past family history, past medical history, past social history, past surgical history, and problem list.     REVIEW OF SYSTEMS   Review of Systems   Per HPI.     Patient Active Problem List   Diagnosis    Orthostatic hypotension    ZENAIDA (acute kidney injury)    CKD (chronic kidney disease) stage 3, GFR 30-59 ml/min (H)    Coronary atherosclerosis     Essential hypertension    Hyperlipidemia    Type 2 diabetes mellitus with stage 3 chronic kidney disease, without long-term current use of insulin (H)      Past Medical History:   Diagnosis Date    Myocardial infarction (H) 1999      Past Surgical History:   Procedure Laterality Date    CHOLECYSTECTOMY      CYSTOSCOPY        Social History:   .  Former smoker.  Patient quit smoking in 1991.  He smoked for 20 years, 1.5 to 2 packs/day.    Family History:   No known family history of nephrolithiasis,  malignancy, or BPH.    Objective      PHYSICAL EXAM   /64    Physical Exam  HENT:      Head: Normocephalic.      Nose: Nose normal.   Eyes:      General: No scleral icterus.  Pulmonary:      Effort: Pulmonary effort is normal.   Abdominal:      General: There is no distension.   Genitourinary:     Comments: Gallagher catheter in draining slightly red to orange urine to a leg bag.  Musculoskeletal:         General: Normal range of motion.      Cervical back: Normal range of motion.   Skin:     Findings: No rash.   Neurological:      General: No focal deficit present.      Mental Status: He is alert and oriented to person, place, and time.   Psychiatric:         Mood and Affect: Mood normal.         Behavior: Behavior normal.          LABORATORY   Recent Labs   Lab Test 07/07/25  1746   COLOR Light Yellow   APPEARANCE Clear   URINEGLC Negative   URINEBILI Negative   URINEKETONE Negative   SG 1.019   UBLD Moderate*   URINEPH 5.0   PROTEIN Negative   NITRITE Negative   LEUKEST Negative   RBCU 36*   WBCU 5     Most recent PSA 12/20/2024 1.24.    ASSESSMENT/PLAN:  1. Benign prostatic hyperplasia with urinary retention    2. Other microscopic hematuria    3. Nephrolithiasis      I had the pleasure today of meeting Mr. Scruggs, 74 year old male who developed acute urinary retention requiring Gallagher catheter placement.  He was started on Augmentin for findings of proctocolitis.  He is still taking this medication.   He was not started on an alpha-blocker if there was a component of BPH contributing to this.  Prior to going to the ER, patient did not sound to have issues with urinary symptoms.    I did discuss with the patient that it would be reasonable for him to have a trial of void, but I do have some concern with doing it today as it is a Friday afternoon.  There is a possibility that even if he passes that he could go back into urinary retention.  Given the timing, this will require him to go back to the ER or urgent care.  Would be more inclined to allow him to do this early next week in the morning so that we can try to keep him out of the ER urgent care if he has difficulties after his trial void.  He is amendable to this.    I also discussed with him that I would recommend starting an alpha-blocker to ensure that this helps with his urination.    Patient also has underwent send on nephrolithiasis with a larger stone noted in his left kidney.  He also has persistent microscopic hematuria with significant smoking history.  He has had persistent microscopic hematuria and not had a recent cystoscopy.    Will plan on the following:    -Will plan on starting Flomax 0.4 mg once daily to help with urination.  This medication works full effect in 1-2 weeks.    -Possible side effects include lightheaded and dizziness and retrograde ejaculation (ejaculate goes into the bladder).  Less common side effects include backache and nasal congestion.     -Complete your antibiotic, as scheduled.    -Will plan on a trial of void on Monday or Tuesday next week.  We hesitant to do today, as if there are issues, you will end up back in urgent care or ER.    -If you pass trial of void next week, repeat post void residual in 2-4 weeks.    -If you do not pass, can learn intermittent catheterization or have Gallagher replaced for a week.    -Will discuss with urologist about possible stone removal given 8 mm size.  If they think it is appropriate,  can do cystoscopy at that time.  If they think monitoring is fine, would consider office cystoscopy given blood in the urine.    Contact us in the interim with questions, concerns, or changes in symptomatology.  Carmen Ramirez PA-C 7/11/2025 1:05 PM   [facility]   [phone]

## 2025-07-11 NOTE — LETTER
7/11/2025       RE: Demetrius Scruggs  [address]  [place] [facility] [place] [address]     Dear Colleague,    Thank you for referring your patient, Demetrius Scruggs, to the [facility] [place] [facility]. Please see a copy of my visit note below.    Subjective     REQUESTING PROVIDER   No ref. provider found     REASON FOR CONSULT   Urinary retention    HISTORY OF PRESENT ILLNESS   Mr. Scruggs is a very pleasant, a 74 year old male seen today in the urology clinic for evaluation of urinary retention.  This developed acutely, causing the patient to present to the emergency department on 07/07/2025 for evaluation.  He had had difficulties having a bowel movement and urinating since early that morning.    CT imaging showed concern for mild proctocolitis without significant stool burden.  Gallagher catheter was placed with 700 mL.  Patient was discharged with Augmentin, which he is still taking.  He was not started on any BPH medication.    Prior to this, patient denied any previous issues with urinary retention.  Prior to present to the emergency department, patient denied any issues with hematuria, dysuria, urgency, frequency, or urinary incontinence.  He generally felt like he empties his bladder and only noted occasional nocturia.  He voided previously catheterized urinary stream is adequate and medium in strength.  He denied any intermittency or hesitancy.    CT imaging also showed evidence of a 8 mm stone within the left kidney.  Patient has had episodes of nephrolithiasis x 2.  No previous history of urinary tract infection.  Urinalysis showed microscopic hematuria, which has been persistent on previous urinalysis.  Patient is a former smoker.  He does take full-strength aspirin daily.    The following portions of the patient's history were reviewed and updated as appropriate: allergies, current medications, past family history, past medical  history, past social history, past surgical history, and problem list.     REVIEW OF SYSTEMS   Review of Systems   Per HPI.     Patient Active Problem List   Diagnosis     Orthostatic hypotension     ZENAIDA (acute kidney injury)     CKD (chronic kidney disease) stage 3, GFR 30-59 ml/min (H)     Coronary atherosclerosis     Essential hypertension     Hyperlipidemia     Type 2 diabetes mellitus with stage 3 chronic kidney disease, without long-term current use of insulin (H)      Past Medical History:   Diagnosis Date     Myocardial infarction (H) 1999      Past Surgical History:   Procedure Laterality Date     CHOLECYSTECTOMY       CYSTOSCOPY        Social History:   .  Former smoker.  Patient quit smoking in 1991.  He smoked for 20 years, 1.5 to 2 packs/day.    Family History:   No known family history of nephrolithiasis,  malignancy, or BPH.    Objective     PHYSICAL EXAM   /64    Physical Exam  HENT:      Head: Normocephalic.      Nose: Nose normal.   Eyes:      General: No scleral icterus.  Pulmonary:      Effort: Pulmonary effort is normal.   Abdominal:      General: There is no distension.   Genitourinary:     Comments: Gallagher catheter in draining slightly red to orange urine to a leg bag.  Musculoskeletal:         General: Normal range of motion.      Cervical back: Normal range of motion.   Skin:     Findings: No rash.   Neurological:      General: No focal deficit present.      Mental Status: He is alert and oriented to person, place, and time.   Psychiatric:         Mood and Affect: Mood normal.         Behavior: Behavior normal.          LABORATORY   Recent Labs   Lab Test 07/07/25  1746   COLOR Light Yellow   APPEARANCE Clear   URINEGLC Negative   URINEBILI Negative   URINEKETONE Negative   SG 1.019   UBLD Moderate*   URINEPH 5.0   PROTEIN Negative   NITRITE Negative   LEUKEST Negative   RBCU 36*   WBCU 5     Most recent PSA 12/20/2024 1.24.    ASSESSMENT/PLAN:  1. Benign prostatic hyperplasia  with urinary retention    2. Other microscopic hematuria    3. Nephrolithiasis      I had the pleasure today of meeting Mr. Scruggs, 74 year old male who developed acute urinary retention requiring Gallagher catheter placement.  He was started on Augmentin for findings of proctocolitis.  He is still taking this medication.  He was not started on an alpha-blocker if there was a component of BPH contributing to this.  Prior to going to the ER, patient did not sound to have issues with urinary symptoms.    I did discuss with the patient that it would be reasonable for him to have a trial of void, but I do have some concern with doing it today as it is a Friday afternoon.  There is a possibility that even if he passes that he could go back into urinary retention.  Given the timing, this will require him to go back to the ER or urgent care.  Would be more inclined to allow him to do this early next week in the morning so that we can try to keep him out of the ER urgent care if he has difficulties after his trial void.  He is amendable to this.    I also discussed with him that I would recommend starting an alpha-blocker to ensure that this helps with his urination.    Patient also has underwent send on nephrolithiasis with a larger stone noted in his left kidney.  He also has persistent microscopic hematuria with significant smoking history.  He has had persistent microscopic hematuria and not had a recent cystoscopy.    Will plan on the following:    -Will plan on starting Flomax 0.4 mg once daily to help with urination.  This medication works full effect in 1-2 weeks.    -Possible side effects include lightheaded and dizziness and retrograde ejaculation (ejaculate goes into the bladder).  Less common side effects include backache and nasal congestion.     -Complete your antibiotic, as scheduled.    -Will plan on a trial of void on Monday or Tuesday next week.  We hesitant to do today, as if there are issues, you will  end up back in urgent care or ER.    -If you pass trial of void next week, repeat post void residual in 2-4 weeks.    -If you do not pass, can learn intermittent catheterization or have Gallagher replaced for a week.    -Will discuss with urologist about possible stone removal given 8 mm size.  If they think it is appropriate, can do cystoscopy at that time.  If they think monitoring is fine, would consider office cystoscopy given blood in the urine.    Contact us in the interim with questions, concerns, or changes in symptomatology.  Carmen Ramirez PA-C 7/11/2025 1:05 PM   [facility]   [phone]     Again, thank you for allowing me to participate in the care of your patient.      Sincerely,    Carmen Ramirez PA-C

## 2025-07-14 ENCOUNTER — ALLIED HEALTH/NURSE VISIT (OUTPATIENT)
Dept: UROLOGY | Facility: CLINIC | Age: 75
End: 2025-07-14
Payer: COMMERCIAL

## 2025-07-14 DIAGNOSIS — R33.8 BENIGN PROSTATIC HYPERPLASIA WITH URINARY RETENTION: Primary | ICD-10-CM

## 2025-07-14 DIAGNOSIS — N40.1 BENIGN PROSTATIC HYPERPLASIA WITH URINARY RETENTION: Primary | ICD-10-CM

## 2025-07-14 PROCEDURE — 99207 PR NO CHARGE NURSE ONLY: CPT

## 2025-07-14 NOTE — PROGRESS NOTES
Demetrius Scruggs comes into clinic today for Urinary Retention  at the request of SATNAM Lagunas, Ordering Provider for Trial of Void.    Patient's catheter was disconnected from leg bag, a sterile tip syringe was attached to catheter end, and 275 mL of sterile water was instilled via gravity into the bladder.  Removed Gallagher catheter after deflating balloon completely.  Patient voided 250 mL     Patient allowed to go home without catheter.    Pt took last dose of Augmentin this morning.  Pt confirmed he is currently taking tamsulosin and has not noted any adverse side effects like dizziness.  Pt was instructed to continue taking tamsulosin as prescribed.    Patient was instructed to call our office during office hours if unable to urinate, or to go to the ER if not during office hours.    This service provided today was under the supervising provider of the day Dr. Cerrato, who was available if needed.    Lilia Pichardo

## 2025-07-15 ENCOUNTER — TELEPHONE (OUTPATIENT)
Dept: UROLOGY | Facility: CLINIC | Age: 75
End: 2025-07-15
Payer: MEDICARE

## 2025-07-15 DIAGNOSIS — N20.0 NEPHROLITHIASIS: Primary | ICD-10-CM

## 2025-07-15 PROBLEM — N18.30 CKD (CHRONIC KIDNEY DISEASE) STAGE 3, GFR 30-59 ML/MIN (H): Status: ACTIVE | Noted: 2018-08-24

## 2025-07-15 PROBLEM — E11.22 TYPE 2 DIABETES MELLITUS WITH STAGE 3 CHRONIC KIDNEY DISEASE, WITHOUT LONG-TERM CURRENT USE OF INSULIN (H): Status: ACTIVE | Noted: 2020-01-10

## 2025-07-15 PROBLEM — N18.30 TYPE 2 DIABETES MELLITUS WITH STAGE 3 CHRONIC KIDNEY DISEASE, WITHOUT LONG-TERM CURRENT USE OF INSULIN (H): Status: ACTIVE | Noted: 2020-01-10

## 2025-07-15 NOTE — TELEPHONE ENCOUNTER
----- Message from Carmen Ramirez sent at 7/15/2025 10:35 AM CDT -----  Patient needs KUB and follow with Cesia with cystoscopy (hematuria) and to review KUB.  Thanks!

## 2025-07-24 ENCOUNTER — HOSPITAL ENCOUNTER (OUTPATIENT)
Dept: GENERAL RADIOLOGY | Facility: CLINIC | Age: 75
Discharge: HOME | End: 2025-07-24
Attending: PHYSICIAN ASSISTANT
Payer: MEDICARE

## 2025-07-24 DIAGNOSIS — N20.0 NEPHROLITHIASIS: ICD-10-CM

## 2025-07-24 PROCEDURE — 74018 RADEX ABDOMEN 1 VIEW: CPT

## 2025-07-26 ENCOUNTER — HEALTH MAINTENANCE LETTER (OUTPATIENT)
Age: 75
End: 2025-07-26